# Patient Record
Sex: MALE | Race: OTHER
[De-identification: names, ages, dates, MRNs, and addresses within clinical notes are randomized per-mention and may not be internally consistent; named-entity substitution may affect disease eponyms.]

---

## 2017-01-21 ENCOUNTER — HOSPITAL ENCOUNTER (EMERGENCY)
Dept: HOSPITAL 54 - ER | Age: 42
Discharge: HOME | End: 2017-01-21
Payer: MEDICARE

## 2017-01-21 VITALS — HEIGHT: 74 IN | WEIGHT: 255 LBS | BODY MASS INDEX: 32.73 KG/M2

## 2017-01-21 VITALS — DIASTOLIC BLOOD PRESSURE: 87 MMHG | SYSTOLIC BLOOD PRESSURE: 162 MMHG

## 2017-01-21 DIAGNOSIS — I10: ICD-10-CM

## 2017-01-21 DIAGNOSIS — R13.10: ICD-10-CM

## 2017-01-21 DIAGNOSIS — K12.2: Primary | ICD-10-CM

## 2017-01-21 DIAGNOSIS — M51.26: ICD-10-CM

## 2017-01-21 DIAGNOSIS — G89.29: ICD-10-CM

## 2017-01-21 DIAGNOSIS — E11.9: ICD-10-CM

## 2017-01-21 DIAGNOSIS — M54.5: ICD-10-CM

## 2017-01-21 DIAGNOSIS — F17.200: ICD-10-CM

## 2017-01-21 PROCEDURE — 99283 EMERGENCY DEPT VISIT LOW MDM: CPT

## 2017-01-21 PROCEDURE — Z7610: HCPCS

## 2017-01-21 PROCEDURE — A4606 OXYGEN PROBE USED W OXIMETER: HCPCS

## 2017-06-03 ENCOUNTER — HOSPITAL ENCOUNTER (EMERGENCY)
Dept: HOSPITAL 54 - ER | Age: 42
LOS: 1 days | Discharge: HOME | End: 2017-06-04
Payer: MEDICARE

## 2017-06-03 VITALS — BODY MASS INDEX: 31.57 KG/M2 | HEIGHT: 74 IN | WEIGHT: 246 LBS

## 2017-06-03 DIAGNOSIS — G89.18: ICD-10-CM

## 2017-06-03 DIAGNOSIS — G89.29: ICD-10-CM

## 2017-06-03 DIAGNOSIS — I10: ICD-10-CM

## 2017-06-03 DIAGNOSIS — M79.671: Primary | ICD-10-CM

## 2017-06-03 DIAGNOSIS — E86.0: ICD-10-CM

## 2017-06-03 DIAGNOSIS — E11.65: ICD-10-CM

## 2017-06-03 DIAGNOSIS — Z91.19: ICD-10-CM

## 2017-06-03 DIAGNOSIS — F17.200: ICD-10-CM

## 2017-06-03 DIAGNOSIS — Z98.890: ICD-10-CM

## 2017-06-03 LAB
BASOPHILS # BLD AUTO: 0 /CMM (ref 0–0.2)
BASOPHILS NFR BLD AUTO: 0.4 % (ref 0–2)
BUN SERPL-MCNC: 17 MG/DL (ref 7–18)
CALCIUM SERPL-MCNC: 8.3 MG/DL (ref 8.5–10.1)
CHLORIDE SERPL-SCNC: 98 MMOL/L (ref 98–107)
CO2 SERPL-SCNC: 26 MMOL/L (ref 21–32)
CREAT SERPL-MCNC: 0.9 MG/DL (ref 0.6–1.3)
EOSINOPHIL # BLD AUTO: 0.2 /CMM (ref 0–0.7)
EOSINOPHIL NFR BLD AUTO: 2.8 % (ref 0–6)
GLUCOSE SERPL-MCNC: 491 MG/DL (ref 74–106)
HCT VFR BLD AUTO: 41 % (ref 39–51)
HGB BLD-MCNC: 14 G/DL (ref 13.5–17.5)
LYMPHOCYTES NFR BLD AUTO: 3 /CMM (ref 0.8–4.8)
LYMPHOCYTES NFR BLD AUTO: 36.4 % (ref 20–44)
MCH RBC QN AUTO: 29 PG (ref 26–33)
MCHC RBC AUTO-ENTMCNC: 34 G/DL (ref 31–36)
MCV RBC AUTO: 86 FL (ref 80–96)
MONOCYTES NFR BLD AUTO: 0.5 /CMM (ref 0.1–1.3)
MONOCYTES NFR BLD AUTO: 6.7 % (ref 2–12)
NEUTROPHILS # BLD AUTO: 4.4 /CMM (ref 1.8–8.9)
NEUTROPHILS NFR BLD AUTO: 53.7 % (ref 43–81)
PLATELET # BLD AUTO: 308 /CMM (ref 150–450)
POTASSIUM SERPL-SCNC: 3.6 MMOL/L (ref 3.5–5.1)
RBC # BLD AUTO: 4.78 MIL/UL (ref 4.5–6)
RDW COEFFICIENT OF VARIATION: 13.9 (ref 11.5–15)
SODIUM SERPL-SCNC: 133 MMOL/L (ref 136–145)
WBC NRBC COR # BLD AUTO: 8.1 K/UL (ref 4.3–11)

## 2017-06-03 PROCEDURE — 73630 X-RAY EXAM OF FOOT: CPT

## 2017-06-03 PROCEDURE — 80048 BASIC METABOLIC PNL TOTAL CA: CPT

## 2017-06-03 PROCEDURE — 82962 GLUCOSE BLOOD TEST: CPT

## 2017-06-03 PROCEDURE — 36415 COLL VENOUS BLD VENIPUNCTURE: CPT

## 2017-06-03 PROCEDURE — 96361 HYDRATE IV INFUSION ADD-ON: CPT

## 2017-06-03 PROCEDURE — 85025 COMPLETE CBC W/AUTO DIFF WBC: CPT

## 2017-06-03 PROCEDURE — Z7610: HCPCS

## 2017-06-03 PROCEDURE — 99285 EMERGENCY DEPT VISIT HI MDM: CPT

## 2017-06-03 PROCEDURE — A4606 OXYGEN PROBE USED W OXIMETER: HCPCS

## 2017-06-03 PROCEDURE — 96360 HYDRATION IV INFUSION INIT: CPT

## 2017-06-03 NOTE — NUR
IV removed. Catheter intact and site benign. Pressure and 4x4 applied to site. 
No bleeding noted. Patient discharged to home in stable condition. Written and 
verbal after care instructions given. Patient verbalizes understanding of 
instruction.  ambulatory with a steady gait

## 2017-06-03 NOTE — NUR
PT AMBULATORY TO ER BED 8 C/O RIGHT ANKLE PAIN X 1 WK, GRADUAL ONSET. PT AOX4 
RR EVEN AND UNLABORED. NO SOB NOTED. NAD NOTED. NO NVD AT THIS TIME. PT NOT 
DIAPHORETIC. PT WAITING FOR MD FONG. CAP REFILL ON TL FEET WNL AND PEDIAL 
PULSE NOTED. PT STATES HAD RIGHT FOOT SX 2 YRS AGO.

## 2017-06-04 VITALS — DIASTOLIC BLOOD PRESSURE: 97 MMHG | SYSTOLIC BLOOD PRESSURE: 162 MMHG

## 2019-04-15 ENCOUNTER — HOSPITAL ENCOUNTER (EMERGENCY)
Dept: HOSPITAL 54 - ER | Age: 44
Discharge: HOME | End: 2019-04-15
Payer: MEDICARE

## 2019-04-15 VITALS — HEIGHT: 74 IN | WEIGHT: 280.12 LBS | BODY MASS INDEX: 35.95 KG/M2

## 2019-04-15 VITALS — SYSTOLIC BLOOD PRESSURE: 181 MMHG | DIASTOLIC BLOOD PRESSURE: 98 MMHG

## 2019-04-15 DIAGNOSIS — E11.9: ICD-10-CM
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
## 2019-04-15 DIAGNOSIS — Z98.890: ICD-10-CM
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
## 2019-04-15 DIAGNOSIS — G89.29: ICD-10-CM

## 2019-04-15 DIAGNOSIS — F17.200: ICD-10-CM

## 2019-04-15 DIAGNOSIS — F10.10: ICD-10-CM

## 2019-04-15 DIAGNOSIS — S62.324A: Primary | ICD-10-CM

## 2019-04-15 DIAGNOSIS — M54.9: ICD-10-CM
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
## 2019-04-15 DIAGNOSIS — I10: ICD-10-CM
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
## 2019-04-15 NOTE — NUR
PT BIBSELF COMPLAINING OF R HAND PAIN/RING FINGER PAIN AFTER "SCUFFLE ON 
THURS." PT AXO4. RESPIRATIONS EVEN AND UNLABORED. PT AMBULATORY WITH STEADY 
GAIT TO BED 9.

## 2019-04-15 NOTE — NUR
called DONOVAN for report; [phone] - report iniated

Note: The patient refused [employer] reporting per primary ED provider

## 2019-04-18 ENCOUNTER — HOSPITAL ENCOUNTER (OUTPATIENT)
Dept: HOSPITAL 54 - WOU | Age: 44
Discharge: HOME | End: 2019-04-18
Attending: SURGERY
Payer: MEDICARE

## 2019-04-18 DIAGNOSIS — E66.9: ICD-10-CM

## 2019-04-18 DIAGNOSIS — S62.304A: Primary | ICD-10-CM

## 2019-04-18 DIAGNOSIS — E10.65: ICD-10-CM
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
## 2019-04-18 DIAGNOSIS — E10.610: ICD-10-CM
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
## 2019-04-18 DIAGNOSIS — I10: ICD-10-CM

## 2019-04-18 DIAGNOSIS — F17.210: ICD-10-CM

## 2019-04-18 PROCEDURE — 2W3CX1Z IMMOBILIZATION OF RIGHT LOWER ARM USING SPLINT: ICD-10-PCS | Performed by: SURGERY

## 2019-04-18 PROCEDURE — 29125 APPL SHORT ARM SPLINT STATIC: CPT

## 2019-04-18 PROCEDURE — A6402 STERILE GAUZE <= 16 SQ IN: HCPCS

## 2019-06-25 ENCOUNTER — HOSPITAL ENCOUNTER (OUTPATIENT)
Dept: HOSPITAL 91 - LAB | Age: 44
Discharge: HOME | End: 2019-06-25
Payer: MEDICARE

## 2019-06-25 ENCOUNTER — HOSPITAL ENCOUNTER (OUTPATIENT)
Dept: HOSPITAL 10 - LAB | Age: 44
Discharge: HOME | End: 2019-06-25
Attending: INTERNAL MEDICINE
Payer: MEDICARE

## 2019-06-25 DIAGNOSIS — R07.9: Primary | ICD-10-CM

## 2019-06-25 DIAGNOSIS — R06.02: ICD-10-CM

## 2019-06-25 LAB
ANION GAP: 9 (ref 5–13)
BLOOD UREA NITROGEN: 17 MG/DL (ref 7–20)
CALCIUM: 8.9 MG/DL (ref 8.4–10.2)
CARBON DIOXIDE: 28 MMOL/L (ref 21–31)
CHLORIDE: 105 MMOL/L (ref 97–110)
CREATININE: 0.91 MG/DL (ref 0.61–1.24)
GLUCOSE: 287 MG/DL (ref 70–220)
POTASSIUM: 4.4 MMOL/L (ref 3.5–5.1)
SODIUM: 142 MMOL/L (ref 135–144)

## 2019-06-25 PROCEDURE — 80048 BASIC METABOLIC PNL TOTAL CA: CPT

## 2019-07-15 ENCOUNTER — HOSPITAL ENCOUNTER (OUTPATIENT)
Dept: HOSPITAL 91 - C/S | Age: 44
Discharge: HOME | End: 2019-07-15
Payer: MEDICARE

## 2019-07-15 ENCOUNTER — HOSPITAL ENCOUNTER (OUTPATIENT)
Dept: HOSPITAL 10 - C/S | Age: 44
Discharge: HOME | End: 2019-07-15
Attending: INTERNAL MEDICINE
Payer: MEDICARE

## 2019-07-15 DIAGNOSIS — R07.9: ICD-10-CM

## 2019-07-15 DIAGNOSIS — R93.1: Primary | ICD-10-CM

## 2019-07-15 PROCEDURE — 75574 CT ANGIO HRT W/3D IMAGE: CPT

## 2019-07-15 PROCEDURE — 75571 CT HRT W/O DYE W/CA TEST: CPT

## 2019-07-15 RX ADMIN — METOPROLOL TARTRATE 1 MG: 100 TABLET ORAL at 12:39

## 2019-07-15 RX ADMIN — DILTIAZEM HYDROCHLORIDE 1 MG: 5 INJECTION INTRAVENOUS at 15:21

## 2019-07-15 RX ADMIN — LABETALOL HYDROCHLORIDE 1 MG: 5 INJECTION, SOLUTION INTRAVENOUS at 14:26

## 2019-08-14 ENCOUNTER — HOSPITAL ENCOUNTER (EMERGENCY)
Dept: HOSPITAL 54 - ER | Age: 44
Discharge: HOME | End: 2019-08-14
Payer: MEDICARE

## 2019-08-14 VITALS — WEIGHT: 285 LBS | BODY MASS INDEX: 36.57 KG/M2 | HEIGHT: 74 IN

## 2019-08-14 VITALS — DIASTOLIC BLOOD PRESSURE: 94 MMHG | SYSTOLIC BLOOD PRESSURE: 162 MMHG

## 2019-08-14 DIAGNOSIS — G89.29: ICD-10-CM

## 2019-08-14 DIAGNOSIS — M54.16: Primary | ICD-10-CM

## 2019-08-14 DIAGNOSIS — Z98.890: ICD-10-CM
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
## 2019-08-14 DIAGNOSIS — F10.10: ICD-10-CM

## 2019-08-14 DIAGNOSIS — E11.9: ICD-10-CM

## 2019-08-14 DIAGNOSIS — I10: ICD-10-CM

## 2019-08-14 DIAGNOSIS — F17.200: ICD-10-CM

## 2020-04-24 ENCOUNTER — HOSPITAL ENCOUNTER (INPATIENT)
Dept: HOSPITAL 54 - ER | Age: 45
LOS: 6 days | Discharge: HOME HEALTH | DRG: 622 | End: 2020-04-30
Attending: INTERNAL MEDICINE | Admitting: NURSE PRACTITIONER
Payer: MEDICARE

## 2020-04-24 VITALS — SYSTOLIC BLOOD PRESSURE: 154 MMHG | DIASTOLIC BLOOD PRESSURE: 81 MMHG

## 2020-04-24 VITALS — HEIGHT: 73 IN | WEIGHT: 287 LBS | BODY MASS INDEX: 38.04 KG/M2

## 2020-04-24 DIAGNOSIS — E11.628: ICD-10-CM

## 2020-04-24 DIAGNOSIS — Z98.1: ICD-10-CM

## 2020-04-24 DIAGNOSIS — E11.65: ICD-10-CM

## 2020-04-24 DIAGNOSIS — F12.90: ICD-10-CM

## 2020-04-24 DIAGNOSIS — E44.1: ICD-10-CM

## 2020-04-24 DIAGNOSIS — N17.0: ICD-10-CM

## 2020-04-24 DIAGNOSIS — M21.40: ICD-10-CM

## 2020-04-24 DIAGNOSIS — E78.1: ICD-10-CM

## 2020-04-24 DIAGNOSIS — E11.610: ICD-10-CM

## 2020-04-24 DIAGNOSIS — Z79.84: ICD-10-CM

## 2020-04-24 DIAGNOSIS — E78.5: ICD-10-CM

## 2020-04-24 DIAGNOSIS — E11.42: ICD-10-CM

## 2020-04-24 DIAGNOSIS — Z83.3: ICD-10-CM

## 2020-04-24 DIAGNOSIS — F17.210: ICD-10-CM

## 2020-04-24 DIAGNOSIS — E66.01: ICD-10-CM

## 2020-04-24 DIAGNOSIS — M19.071: ICD-10-CM

## 2020-04-24 DIAGNOSIS — L97.519: ICD-10-CM

## 2020-04-24 DIAGNOSIS — E11.621: Primary | ICD-10-CM

## 2020-04-24 DIAGNOSIS — Z79.899: ICD-10-CM

## 2020-04-24 DIAGNOSIS — D64.9: ICD-10-CM

## 2020-04-24 DIAGNOSIS — Z79.4: ICD-10-CM

## 2020-04-24 DIAGNOSIS — L03.115: ICD-10-CM

## 2020-04-24 DIAGNOSIS — D68.59: ICD-10-CM

## 2020-04-24 DIAGNOSIS — G89.29: ICD-10-CM

## 2020-04-24 DIAGNOSIS — R65.11: ICD-10-CM

## 2020-04-24 LAB
ALBUMIN SERPL BCP-MCNC: 2.6 G/DL (ref 3.4–5)
ALP SERPL-CCNC: 147 U/L (ref 46–116)
ALT SERPL W P-5'-P-CCNC: 43 U/L (ref 12–78)
AST SERPL W P-5'-P-CCNC: 27 U/L (ref 15–37)
BASOPHILS # BLD AUTO: 0.1 /CMM (ref 0–0.2)
BASOPHILS NFR BLD AUTO: 0.4 % (ref 0–2)
BILIRUB DIRECT SERPL-MCNC: 0.1 MG/DL (ref 0–0.2)
BILIRUB SERPL-MCNC: 0.3 MG/DL (ref 0.2–1)
BUN SERPL-MCNC: 25 MG/DL (ref 7–18)
CALCIUM SERPL-MCNC: 8.6 MG/DL (ref 8.5–10.1)
CHLORIDE SERPL-SCNC: 102 MMOL/L (ref 98–107)
CO2 SERPL-SCNC: 29 MMOL/L (ref 21–32)
CREAT SERPL-MCNC: 1.5 MG/DL (ref 0.6–1.3)
EOSINOPHIL NFR BLD AUTO: 2.1 % (ref 0–6)
GLUCOSE SERPL-MCNC: 293 MG/DL (ref 74–106)
HCT VFR BLD AUTO: 42 % (ref 39–51)
HGB BLD-MCNC: 13.8 G/DL (ref 13.5–17.5)
LYMPHOCYTES NFR BLD AUTO: 1.6 /CMM (ref 0.8–4.8)
LYMPHOCYTES NFR BLD AUTO: 11.4 % (ref 20–44)
MCHC RBC AUTO-ENTMCNC: 33 G/DL (ref 31–36)
MCV RBC AUTO: 87 FL (ref 80–96)
MONOCYTES NFR BLD AUTO: 0.8 /CMM (ref 0.1–1.3)
MONOCYTES NFR BLD AUTO: 5.8 % (ref 2–12)
NEUTROPHILS # BLD AUTO: 11.6 /CMM (ref 1.8–8.9)
NEUTROPHILS NFR BLD AUTO: 80.3 % (ref 43–81)
PLATELET # BLD AUTO: 326 /CMM (ref 150–450)
POTASSIUM SERPL-SCNC: 4.3 MMOL/L (ref 3.5–5.1)
PROT SERPL-MCNC: 6.3 G/DL (ref 6.4–8.2)
RBC # BLD AUTO: 4.77 MIL/UL (ref 4.5–6)
SODIUM SERPL-SCNC: 137 MMOL/L (ref 136–145)
WBC NRBC COR # BLD AUTO: 14.4 K/UL (ref 4.3–11)

## 2020-04-24 PROCEDURE — A6403 STERILE GAUZE>16 <= 48 SQ IN: HCPCS

## 2020-04-24 PROCEDURE — G0378 HOSPITAL OBSERVATION PER HR: HCPCS

## 2020-04-24 PROCEDURE — A4362 SOLID SKIN BARRIER: HCPCS

## 2020-04-24 RX ADMIN — HEPARIN SODIUM SCH UNITS: 5000 INJECTION INTRAVENOUS; SUBCUTANEOUS at 23:28

## 2020-04-24 RX ADMIN — HUMAN INSULIN PRN UNIT: 100 INJECTION, SOLUTION SUBCUTANEOUS at 23:49

## 2020-04-24 RX ADMIN — Medication SCH EACH: at 22:00

## 2020-04-24 RX ADMIN — SODIUM CHLORIDE PRN MLS/HR: 9 INJECTION, SOLUTION INTRAVENOUS at 23:25

## 2020-04-24 NOTE — NUR
ASSUMED CARE OF PT; wound to pad of r foot x 3 days s/p walking with rock in 
sandal, PT AAOX4, -SOB, NAD NOTED, PENDING MD FONG

## 2020-04-24 NOTE — NUR
MS RN ADMISSION NOTE 



RECEIVED PATIENT VIA WHEEL CHAIR. PATIENT AMBULATED TO BED WITH STEADY GAIT. TOLERATING ROOM 
AIR. RESPIRATIONS ARE EVEN AND UNLABORED. NO S/S SOB NOTED. STATES PAIN IS 9/10 AT THIS 
TIME, INFORMED I WILL SEE WHAT DOCTOR ORDERED FOR PAIN. IN NO APPARENT DISTRESS. IV ACCESS 
IN LAC#20 PATENT AND SALINE LOCKED. INATAL AND PHYSICAL ASSESSMENT COMPLETED, SKIN 
ASSESSMENT COMPLETED AND PICTURES TAKEN AND PLACED IN CHART. HOME MEDICATIONS TAKEN AND 
SIGNED OVER. VITAL SIGNS OBTAINED BY CNA AS WELL AS BELONGINGS LIST COMPLETED. BED IS LOW 
AND LOCKED, HOB ELEVATED IN SEMI FOWLERS, SIDE RIALS UP X2, CALL LIGHT WITHIN REACH. WILL 
CONTINUE TO MONITOR.

## 2020-04-25 VITALS — SYSTOLIC BLOOD PRESSURE: 160 MMHG | DIASTOLIC BLOOD PRESSURE: 90 MMHG

## 2020-04-25 VITALS — DIASTOLIC BLOOD PRESSURE: 101 MMHG | SYSTOLIC BLOOD PRESSURE: 161 MMHG

## 2020-04-25 VITALS — SYSTOLIC BLOOD PRESSURE: 135 MMHG | DIASTOLIC BLOOD PRESSURE: 85 MMHG

## 2020-04-25 LAB
APPEARANCE UR: CLEAR
BASOPHILS # BLD AUTO: 0 /CMM (ref 0–0.2)
BASOPHILS NFR BLD AUTO: 0.5 % (ref 0–2)
BILIRUB UR QL STRIP: NEGATIVE
BUN SERPL-MCNC: 24 MG/DL (ref 7–18)
CALCIUM SERPL-MCNC: 7.5 MG/DL (ref 8.5–10.1)
CHLORIDE SERPL-SCNC: 103 MMOL/L (ref 98–107)
CHOLEST SERPL-MCNC: 126 MG/DL (ref ?–200)
CO2 SERPL-SCNC: 24 MMOL/L (ref 21–32)
COLOR UR: YELLOW
CREAT SERPL-MCNC: 1.3 MG/DL (ref 0.6–1.3)
EOSINOPHIL NFR BLD AUTO: 6.1 % (ref 0–6)
GLUCOSE SERPL-MCNC: 303 MG/DL (ref 74–106)
GLUCOSE UR STRIP-MCNC: >=1000 MG/DL
HCT VFR BLD AUTO: 35 % (ref 39–51)
HDLC SERPL-MCNC: 43 MG/DL (ref 40–60)
HGB BLD-MCNC: 12.1 G/DL (ref 13.5–17.5)
HGB UR QL STRIP: (no result) ERY/UL
KETONES UR STRIP-MCNC: NEGATIVE MG/DL
LDLC SERPL DIRECT ASSAY-MCNC: 62 MG/DL (ref 0–99)
LEUKOCYTE ESTERASE UR QL STRIP: NEGATIVE
LYMPHOCYTES NFR BLD AUTO: 2.1 /CMM (ref 0.8–4.8)
LYMPHOCYTES NFR BLD AUTO: 23.1 % (ref 20–44)
MAGNESIUM SERPL-MCNC: 2 MG/DL (ref 1.8–2.4)
MCHC RBC AUTO-ENTMCNC: 34 G/DL (ref 31–36)
MCV RBC AUTO: 88 FL (ref 80–96)
MONOCYTES NFR BLD AUTO: 0.6 /CMM (ref 0.1–1.3)
MONOCYTES NFR BLD AUTO: 6.9 % (ref 2–12)
NEUTROPHILS # BLD AUTO: 5.7 /CMM (ref 1.8–8.9)
NEUTROPHILS NFR BLD AUTO: 63.4 % (ref 43–81)
NITRITE UR QL STRIP: NEGATIVE
PH UR STRIP: 6 [PH] (ref 5–8)
PHOSPHATE SERPL-MCNC: 4.4 MG/DL (ref 2.5–4.9)
PLATELET # BLD AUTO: 258 /CMM (ref 150–450)
POTASSIUM SERPL-SCNC: 3.8 MMOL/L (ref 3.5–5.1)
PROT UR QL STRIP: >=300 MG/DL
RBC # BLD AUTO: 4.03 MIL/UL (ref 4.5–6)
RBC #/AREA URNS HPF: (no result) /HPF (ref 0–2)
SODIUM SERPL-SCNC: 137 MMOL/L (ref 136–145)
TRIGL SERPL-MCNC: 240 MG/DL (ref 30–150)
UROBILINOGEN UR STRIP-MCNC: 0.2 EU/DL
WBC #/AREA URNS HPF: (no result) /HPF (ref 0–3)
WBC NRBC COR # BLD AUTO: 8.9 K/UL (ref 4.3–11)

## 2020-04-25 PROCEDURE — 0JBQ0ZZ EXCISION OF RIGHT FOOT SUBCUTANEOUS TISSUE AND FASCIA, OPEN APPROACH: ICD-10-PCS | Performed by: STUDENT IN AN ORGANIZED HEALTH CARE EDUCATION/TRAINING PROGRAM

## 2020-04-25 RX ADMIN — PIPERACILLIN SODIUM AND TAZOBACTAM SODIUM SCH MLS/HR: .375; 3 INJECTION, POWDER, LYOPHILIZED, FOR SOLUTION INTRAVENOUS at 00:21

## 2020-04-25 RX ADMIN — INSULIN HUMAN PRN UNIT: 100 INJECTION, SOLUTION PARENTERAL at 06:17

## 2020-04-25 RX ADMIN — Medication PRN MG: at 22:11

## 2020-04-25 RX ADMIN — PIPERACILLIN SODIUM AND TAZOBACTAM SODIUM SCH MLS/HR: .375; 3 INJECTION, POWDER, LYOPHILIZED, FOR SOLUTION INTRAVENOUS at 14:01

## 2020-04-25 RX ADMIN — SODIUM CHLORIDE PRN MLS/HR: 9 INJECTION, SOLUTION INTRAVENOUS at 20:56

## 2020-04-25 RX ADMIN — HEPARIN SODIUM SCH UNITS: 5000 INJECTION INTRAVENOUS; SUBCUTANEOUS at 10:02

## 2020-04-25 RX ADMIN — DEXTROSE MONOHYDRATE SCH MLS/HR: 50 INJECTION, SOLUTION INTRAVENOUS at 21:00

## 2020-04-25 RX ADMIN — PREGABALIN SCH MG: 100 CAPSULE ORAL at 14:01

## 2020-04-25 RX ADMIN — NICOTINE SCH MG: 14 PATCH TRANSDERMAL at 03:01

## 2020-04-25 RX ADMIN — ATORVASTATIN CALCIUM SCH MG: 40 TABLET, FILM COATED ORAL at 00:46

## 2020-04-25 RX ADMIN — OXYCODONE HYDROCHLORIDE AND ACETAMINOPHEN PRN UDTAB: 5; 325 TABLET ORAL at 11:38

## 2020-04-25 RX ADMIN — HEPARIN SODIUM SCH UNITS: 5000 INJECTION INTRAVENOUS; SUBCUTANEOUS at 22:06

## 2020-04-25 RX ADMIN — METOPROLOL SUCCINATE SCH MG: 25 TABLET, EXTENDED RELEASE ORAL at 10:00

## 2020-04-25 RX ADMIN — Medication PRN MG: at 18:05

## 2020-04-25 RX ADMIN — ATORVASTATIN CALCIUM SCH MG: 40 TABLET, FILM COATED ORAL at 21:58

## 2020-04-25 RX ADMIN — Medication SCH EACH: at 06:15

## 2020-04-25 RX ADMIN — PREGABALIN SCH MG: 100 CAPSULE ORAL at 09:59

## 2020-04-25 RX ADMIN — PREGABALIN SCH MG: 100 CAPSULE ORAL at 18:13

## 2020-04-25 RX ADMIN — Medication PRN MG: at 09:40

## 2020-04-25 RX ADMIN — Medication SCH EACH: at 22:21

## 2020-04-25 RX ADMIN — Medication SCH EACH: at 13:23

## 2020-04-25 RX ADMIN — PIPERACILLIN SODIUM AND TAZOBACTAM SODIUM SCH MLS/HR: .375; 3 INJECTION, POWDER, LYOPHILIZED, FOR SOLUTION INTRAVENOUS at 18:20

## 2020-04-25 RX ADMIN — HUMAN INSULIN PRN UNIT: 100 INJECTION, SOLUTION SUBCUTANEOUS at 22:23

## 2020-04-25 RX ADMIN — ASPIRIN 81 MG SCH MG: 81 TABLET ORAL at 09:59

## 2020-04-25 RX ADMIN — Medication PRN MG: at 13:35

## 2020-04-25 RX ADMIN — NICOTINE SCH MG: 14 PATCH TRANSDERMAL at 10:00

## 2020-04-25 RX ADMIN — PREGABALIN SCH MG: 100 CAPSULE ORAL at 00:46

## 2020-04-25 RX ADMIN — PIPERACILLIN SODIUM AND TAZOBACTAM SODIUM SCH MLS/HR: .375; 3 INJECTION, POWDER, LYOPHILIZED, FOR SOLUTION INTRAVENOUS at 06:07

## 2020-04-25 RX ADMIN — INSULIN HUMAN PRN UNIT: 100 INJECTION, SOLUTION PARENTERAL at 13:29

## 2020-04-25 RX ADMIN — Medication SCH EACH: at 18:14

## 2020-04-25 RX ADMIN — INSULIN HUMAN PRN UNIT: 100 INJECTION, SOLUTION PARENTERAL at 18:23

## 2020-04-25 RX ADMIN — DEXTROSE MONOHYDRATE SCH MLS/HR: 50 INJECTION, SOLUTION INTRAVENOUS at 11:21

## 2020-04-25 RX ADMIN — OXYCODONE HYDROCHLORIDE AND ACETAMINOPHEN PRN UDTAB: 5; 325 TABLET ORAL at 00:47

## 2020-04-25 NOTE — NUR
MS RN: RECEIVED PATIENT 

Patient in bed, awake. On room air, tolerating room air. Right foot dressing C/D/I. Pain 
scale and pain medication reviewed with patient, verbalized understanding. IVF infusing. 
Fall precaution maintained.

## 2020-04-25 NOTE — NUR
MS RN NOTE 



CALLED ON CALL MD DAVIN CHAVEZ TO INFORM ABOUT PATIENTS REQUEST FOR A NICOTINE PATCH. HE 
SMOKES ONE PACK PER DAY. MD TELEPHONE ORDER 14MG NICOTINE PATCH. ORDER READ BACK NOTED AND 
CARRIED OUT.

## 2020-04-25 NOTE — NUR
TIFFANY NP,AVNI ORDAZ.DR. RAUSCH IN.CULTURE DONE OF RT. FOOT.DEBRIDEMENT DONE AND CAT 
SCAN ORDERED.UA AND URINE CULTURE SENT.MED. X 3 FOR PAIN.

## 2020-04-25 NOTE — NUR
MS RN NOTE 



ADMINISTERED FLEXORIL PER PATIENT REQUEST. ADMINISTERED PRN PERCOCET 5/325 X2 TAB D/T C/O 
PAIN 10/10 IN RIGHT FOOT. WILL CONTINUE TO MONITOR.

## 2020-04-26 VITALS — SYSTOLIC BLOOD PRESSURE: 170 MMHG | DIASTOLIC BLOOD PRESSURE: 96 MMHG

## 2020-04-26 VITALS — DIASTOLIC BLOOD PRESSURE: 86 MMHG | SYSTOLIC BLOOD PRESSURE: 176 MMHG

## 2020-04-26 VITALS — SYSTOLIC BLOOD PRESSURE: 150 MMHG | DIASTOLIC BLOOD PRESSURE: 94 MMHG

## 2020-04-26 LAB
BASOPHILS # BLD AUTO: 0 /CMM (ref 0–0.2)
BASOPHILS NFR BLD AUTO: 0.5 % (ref 0–2)
BUN SERPL-MCNC: 13 MG/DL (ref 7–18)
CALCIUM SERPL-MCNC: 7.4 MG/DL (ref 8.5–10.1)
CHLORIDE SERPL-SCNC: 103 MMOL/L (ref 98–107)
CO2 SERPL-SCNC: 25 MMOL/L (ref 21–32)
CREAT SERPL-MCNC: 0.9 MG/DL (ref 0.6–1.3)
EOSINOPHIL NFR BLD AUTO: 5.9 % (ref 0–6)
GLUCOSE SERPL-MCNC: 223 MG/DL (ref 74–106)
HCT VFR BLD AUTO: 34 % (ref 39–51)
HGB BLD-MCNC: 11.8 G/DL (ref 13.5–17.5)
LYMPHOCYTES NFR BLD AUTO: 1.6 /CMM (ref 0.8–4.8)
LYMPHOCYTES NFR BLD AUTO: 23.8 % (ref 20–44)
MCHC RBC AUTO-ENTMCNC: 34 G/DL (ref 31–36)
MCV RBC AUTO: 86 FL (ref 80–96)
MONOCYTES NFR BLD AUTO: 0.5 /CMM (ref 0.1–1.3)
MONOCYTES NFR BLD AUTO: 7.3 % (ref 2–12)
NEUTROPHILS # BLD AUTO: 4.3 /CMM (ref 1.8–8.9)
NEUTROPHILS NFR BLD AUTO: 62.5 % (ref 43–81)
PLATELET # BLD AUTO: 264 /CMM (ref 150–450)
POTASSIUM SERPL-SCNC: 3.8 MMOL/L (ref 3.5–5.1)
RBC # BLD AUTO: 3.98 MIL/UL (ref 4.5–6)
SODIUM SERPL-SCNC: 136 MMOL/L (ref 136–145)
WBC NRBC COR # BLD AUTO: 6.9 K/UL (ref 4.3–11)

## 2020-04-26 RX ADMIN — METOPROLOL SUCCINATE SCH MG: 25 TABLET, EXTENDED RELEASE ORAL at 08:10

## 2020-04-26 RX ADMIN — PIPERACILLIN SODIUM AND TAZOBACTAM SODIUM SCH MLS/HR: .375; 3 INJECTION, POWDER, LYOPHILIZED, FOR SOLUTION INTRAVENOUS at 23:35

## 2020-04-26 RX ADMIN — Medication PRN MG: at 21:57

## 2020-04-26 RX ADMIN — Medication SCH EACH: at 07:55

## 2020-04-26 RX ADMIN — SODIUM CHLORIDE PRN MLS/HR: 9 INJECTION, SOLUTION INTRAVENOUS at 23:37

## 2020-04-26 RX ADMIN — NICOTINE SCH MG: 14 PATCH TRANSDERMAL at 08:10

## 2020-04-26 RX ADMIN — HEPARIN SODIUM SCH UNITS: 5000 INJECTION INTRAVENOUS; SUBCUTANEOUS at 08:16

## 2020-04-26 RX ADMIN — PIPERACILLIN SODIUM AND TAZOBACTAM SODIUM SCH MLS/HR: .375; 3 INJECTION, POWDER, LYOPHILIZED, FOR SOLUTION INTRAVENOUS at 00:44

## 2020-04-26 RX ADMIN — PREGABALIN SCH MG: 100 CAPSULE ORAL at 08:10

## 2020-04-26 RX ADMIN — Medication PRN MG: at 03:25

## 2020-04-26 RX ADMIN — DEXTROSE MONOHYDRATE SCH MLS/HR: 50 INJECTION, SOLUTION INTRAVENOUS at 08:05

## 2020-04-26 RX ADMIN — PREGABALIN SCH MG: 100 CAPSULE ORAL at 17:04

## 2020-04-26 RX ADMIN — HUMAN INSULIN PRN UNIT: 100 INJECTION, SOLUTION SUBCUTANEOUS at 21:25

## 2020-04-26 RX ADMIN — PREGABALIN SCH MG: 100 CAPSULE ORAL at 13:29

## 2020-04-26 RX ADMIN — HEPARIN SODIUM SCH UNITS: 5000 INJECTION INTRAVENOUS; SUBCUTANEOUS at 21:12

## 2020-04-26 RX ADMIN — INSULIN HUMAN PRN UNIT: 100 INJECTION, SOLUTION PARENTERAL at 17:44

## 2020-04-26 RX ADMIN — Medication PRN MG: at 17:47

## 2020-04-26 RX ADMIN — DEXTROSE MONOHYDRATE SCH MLS/HR: 50 INJECTION, SOLUTION INTRAVENOUS at 21:07

## 2020-04-26 RX ADMIN — INSULIN HUMAN PRN UNIT: 100 INJECTION, SOLUTION PARENTERAL at 06:38

## 2020-04-26 RX ADMIN — PIPERACILLIN SODIUM AND TAZOBACTAM SODIUM SCH MLS/HR: .375; 3 INJECTION, POWDER, LYOPHILIZED, FOR SOLUTION INTRAVENOUS at 05:53

## 2020-04-26 RX ADMIN — Medication SCH EACH: at 13:25

## 2020-04-26 RX ADMIN — Medication PRN MG: at 08:32

## 2020-04-26 RX ADMIN — PIPERACILLIN SODIUM AND TAZOBACTAM SODIUM SCH MLS/HR: .375; 3 INJECTION, POWDER, LYOPHILIZED, FOR SOLUTION INTRAVENOUS at 17:08

## 2020-04-26 RX ADMIN — ASPIRIN 81 MG SCH MG: 81 TABLET ORAL at 08:10

## 2020-04-26 RX ADMIN — Medication SCH EACH: at 21:20

## 2020-04-26 RX ADMIN — Medication SCH EACH: at 17:03

## 2020-04-26 RX ADMIN — INSULIN HUMAN PRN UNIT: 100 INJECTION, SOLUTION PARENTERAL at 08:03

## 2020-04-26 RX ADMIN — ATORVASTATIN CALCIUM SCH MG: 40 TABLET, FILM COATED ORAL at 21:12

## 2020-04-26 RX ADMIN — Medication PRN MG: at 13:32

## 2020-04-26 RX ADMIN — PIPERACILLIN SODIUM AND TAZOBACTAM SODIUM SCH MLS/HR: .375; 3 INJECTION, POWDER, LYOPHILIZED, FOR SOLUTION INTRAVENOUS at 13:25

## 2020-04-26 RX ADMIN — INSULIN HUMAN PRN UNIT: 100 INJECTION, SOLUTION PARENTERAL at 13:28

## 2020-04-26 NOTE — NUR
rn notes

 administered morphine sulfate 4 mg/ml iv push for right leg acute pain 8/10 per patient 
request, v/s taken bp 170/ 88, p-94, r-20.

## 2020-04-26 NOTE — NUR
RN NOTES

 RECEIVED PATIENT IN THE BED A/O X4, NO ACUTE RESPIRATORY DISTRESS, V/S TAKEN STABLE, WAS 
COMPLAINING OF PAIN ON RIGHT LEG, BS-233MG/DL, INFUSING NS AT 60 ML/HR  LEFT AC AREA INTACT. 
ENCOURAGED TO KEEP RIGHT LEG ELEVATED USING PILLOW, PATIENT USING URINAL. ADMINISTERED 
SCHEDULED MEDICATION, AND STARTED VANCOMYCIN INFUSING  ML/HR. NEEDS ATTENDED  AND 
ANTICIPATED. CONTINUED MONITORING.

## 2020-04-26 NOTE — NUR
RN NOTES

 ADMINISTERED MORPHINE SULFATE 4 MG/ML IV PUSH FOR RIGHT LEG PAIN 9/10 PER PAIN SCALE,  AND 
ZOFRAN 4 MG/ML IV PUSH FOR NAUSEA PER PATIENT REQUEST, V/S TAKEN /94, P-84, CALL LIGHT 
WITHIN TO REACH. CONTINUED MONITORING.

## 2020-04-26 NOTE — NUR
RECEIVED IN BED . ALERT AND ORIENTATED. ASKING FOR FOOD . NOTED RT DELTOID HE HAS A NICODERM 
PATCH ON.  HE IS FRIENDLY AND COOPERATITVE

## 2020-04-26 NOTE — NUR
RN NOTES

 ADMINISTERED MORPHINE SULFATE 4 MG/ML IV PUSH FOR PAIN ON RIGHT LEG 9/10 PER PATIENT 
REQUEST, V/S TAKEN //107, P-84, R-20.  BS-246 MG/DL COVERAGE GIVEN, PATIENT USING 
WALKER TO AMBULATE, TOLERATED LUNCH 100%.  ADMINISTERED SCHEDULED MEDICATION.

## 2020-04-26 NOTE — NUR
RN NOTES

 MEDICATION WERE ADMINISTERED FOR PAIN EFFECTIVE,  PATIENT TOLERATED DINER WELL, 
ADMINISTERED SCHEDULED MEDICATION, BS-160 MG/DL COVERAGE GIVEN, INFUSING NS 60 ML/HR AT LEFT 
AC AREA, INTACT. CALL LIGHT WITHIN TO REACH. ENDORSED ONCOMING NURSE FOLLOW PLAN OF CARE.

## 2020-04-26 NOTE — NUR
MS RN: END OF SHIFT REPORT

Patient in bed. Stable Oxygen saturation on room air, tolerating well. IVF infusing, IV 
antibiotic as scheduled, afebrile overnight. Right foot dressing C/D/I. NWB RLE per 
Podiatry. PT to provide boot to right foot. Right foot pain managed with PRN Morphine. Fall 
precaution maintained.

## 2020-04-27 VITALS — DIASTOLIC BLOOD PRESSURE: 88 MMHG | SYSTOLIC BLOOD PRESSURE: 164 MMHG

## 2020-04-27 VITALS — DIASTOLIC BLOOD PRESSURE: 88 MMHG | SYSTOLIC BLOOD PRESSURE: 159 MMHG

## 2020-04-27 VITALS — DIASTOLIC BLOOD PRESSURE: 87 MMHG | SYSTOLIC BLOOD PRESSURE: 157 MMHG

## 2020-04-27 VITALS — DIASTOLIC BLOOD PRESSURE: 99 MMHG | SYSTOLIC BLOOD PRESSURE: 168 MMHG

## 2020-04-27 VITALS — DIASTOLIC BLOOD PRESSURE: 72 MMHG | SYSTOLIC BLOOD PRESSURE: 185 MMHG

## 2020-04-27 VITALS — DIASTOLIC BLOOD PRESSURE: 76 MMHG | SYSTOLIC BLOOD PRESSURE: 136 MMHG

## 2020-04-27 LAB
BASOPHILS # BLD AUTO: 0 /CMM (ref 0–0.2)
BASOPHILS NFR BLD AUTO: 0.5 % (ref 0–2)
BUN SERPL-MCNC: 10 MG/DL (ref 7–18)
CALCIUM SERPL-MCNC: 7.8 MG/DL (ref 8.5–10.1)
CHLORIDE SERPL-SCNC: 104 MMOL/L (ref 98–107)
CO2 SERPL-SCNC: 27 MMOL/L (ref 21–32)
CREAT SERPL-MCNC: 1 MG/DL (ref 0.6–1.3)
EOSINOPHIL NFR BLD AUTO: 6.2 % (ref 0–6)
GLUCOSE SERPL-MCNC: 214 MG/DL (ref 74–106)
HCT VFR BLD AUTO: 34 % (ref 39–51)
HGB BLD-MCNC: 11.8 G/DL (ref 13.5–17.5)
LYMPHOCYTES NFR BLD AUTO: 2 /CMM (ref 0.8–4.8)
LYMPHOCYTES NFR BLD AUTO: 31.5 % (ref 20–44)
MCHC RBC AUTO-ENTMCNC: 35 G/DL (ref 31–36)
MCV RBC AUTO: 86 FL (ref 80–96)
MONOCYTES NFR BLD AUTO: 0.6 /CMM (ref 0.1–1.3)
MONOCYTES NFR BLD AUTO: 9.2 % (ref 2–12)
NEUTROPHILS # BLD AUTO: 3.4 /CMM (ref 1.8–8.9)
NEUTROPHILS NFR BLD AUTO: 52.6 % (ref 43–81)
PLATELET # BLD AUTO: 277 /CMM (ref 150–450)
POTASSIUM SERPL-SCNC: 3.7 MMOL/L (ref 3.5–5.1)
RBC # BLD AUTO: 3.94 MIL/UL (ref 4.5–6)
SODIUM SERPL-SCNC: 139 MMOL/L (ref 136–145)
WBC NRBC COR # BLD AUTO: 6.5 K/UL (ref 4.3–11)

## 2020-04-27 RX ADMIN — DEXTROSE MONOHYDRATE SCH MLS/HR: 50 INJECTION, SOLUTION INTRAVENOUS at 20:37

## 2020-04-27 RX ADMIN — PIPERACILLIN SODIUM AND TAZOBACTAM SODIUM SCH MLS/HR: .375; 3 INJECTION, POWDER, LYOPHILIZED, FOR SOLUTION INTRAVENOUS at 05:50

## 2020-04-27 RX ADMIN — ASPIRIN 81 MG SCH MG: 81 TABLET ORAL at 08:30

## 2020-04-27 RX ADMIN — PIPERACILLIN SODIUM AND TAZOBACTAM SODIUM SCH MLS/HR: .375; 3 INJECTION, POWDER, LYOPHILIZED, FOR SOLUTION INTRAVENOUS at 17:02

## 2020-04-27 RX ADMIN — INSULIN HUMAN PRN UNIT: 100 INJECTION, SOLUTION PARENTERAL at 06:37

## 2020-04-27 RX ADMIN — METOPROLOL SUCCINATE SCH MG: 25 TABLET, EXTENDED RELEASE ORAL at 08:30

## 2020-04-27 RX ADMIN — CLONIDINE HYDROCHLORIDE PRN MG: 0.1 TABLET ORAL at 14:05

## 2020-04-27 RX ADMIN — ATORVASTATIN CALCIUM SCH MG: 40 TABLET, FILM COATED ORAL at 21:27

## 2020-04-27 RX ADMIN — MUPIROCIN SCH GM: 20 OINTMENT TOPICAL at 21:48

## 2020-04-27 RX ADMIN — HEPARIN SODIUM SCH UNITS: 5000 INJECTION INTRAVENOUS; SUBCUTANEOUS at 08:40

## 2020-04-27 RX ADMIN — PIPERACILLIN SODIUM AND TAZOBACTAM SODIUM SCH MLS/HR: .375; 3 INJECTION, POWDER, LYOPHILIZED, FOR SOLUTION INTRAVENOUS at 13:05

## 2020-04-27 RX ADMIN — Medication SCH EACH: at 06:33

## 2020-04-27 RX ADMIN — Medication PRN MG: at 04:06

## 2020-04-27 RX ADMIN — HUMAN INSULIN PRN UNIT: 100 INJECTION, SOLUTION SUBCUTANEOUS at 21:58

## 2020-04-27 RX ADMIN — NICOTINE SCH MG: 14 PATCH TRANSDERMAL at 08:29

## 2020-04-27 RX ADMIN — HYDROMORPHONE HYDROCHLORIDE PRN MG: 1 INJECTION, SOLUTION INTRAMUSCULAR; INTRAVENOUS; SUBCUTANEOUS at 20:38

## 2020-04-27 RX ADMIN — PREGABALIN SCH MG: 100 CAPSULE ORAL at 17:00

## 2020-04-27 RX ADMIN — INSULIN HUMAN PRN UNIT: 100 INJECTION, SOLUTION PARENTERAL at 17:03

## 2020-04-27 RX ADMIN — HEPARIN SODIUM SCH UNITS: 5000 INJECTION INTRAVENOUS; SUBCUTANEOUS at 20:39

## 2020-04-27 RX ADMIN — PREGABALIN SCH MG: 100 CAPSULE ORAL at 13:04

## 2020-04-27 RX ADMIN — Medication SCH EACH: at 21:53

## 2020-04-27 RX ADMIN — INSULIN HUMAN PRN UNIT: 100 INJECTION, SOLUTION PARENTERAL at 13:02

## 2020-04-27 RX ADMIN — PREGABALIN SCH MG: 100 CAPSULE ORAL at 08:32

## 2020-04-27 RX ADMIN — HYDROMORPHONE HYDROCHLORIDE PRN MG: 1 INJECTION, SOLUTION INTRAMUSCULAR; INTRAVENOUS; SUBCUTANEOUS at 13:35

## 2020-04-27 RX ADMIN — DEXTROSE MONOHYDRATE SCH MLS/HR: 50 INJECTION, SOLUTION INTRAVENOUS at 08:25

## 2020-04-27 RX ADMIN — Medication SCH EACH: at 13:04

## 2020-04-27 RX ADMIN — Medication SCH EACH: at 17:01

## 2020-04-27 RX ADMIN — Medication PRN MG: at 08:31

## 2020-04-27 RX ADMIN — MUPIROCIN SCH GM: 20 OINTMENT TOPICAL at 12:59

## 2020-04-27 RX ADMIN — CLONIDINE HYDROCHLORIDE PRN MG: 0.1 TABLET ORAL at 22:22

## 2020-04-27 NOTE — NUR
rn notes

 medication were administered for Bp effective bp 135/76, p-63, bs-276 mg/dl coverage given, 
patient tolerated dinner well, patient wearing boots on right lef, non-WB RLE. call light 
within to reach. endorsed oncoming nurse follow plan of care.

## 2020-04-27 NOTE — NUR
RN NOTES

 ADMINISTERED MORPHINE SULFATE 4MG/ML IV PUSH FOR PAIN 8/10 PER PAIN SCALE, PER PATIENT 
REQUEST. V/S TAKEN /86, P-98, R-20. ENCOURAGED TO INCREASE FLUID INTAKE.

## 2020-04-27 NOTE — NUR
WOUND CARE CONSULT: PT PRESENTS WITH SURGICAL DRESSING TO RT LOWER EXTREMITY AND IS FOLLOWED 
BY PODIATRY TEAM. DEFER TO DPM FOR WOUND TREATMENT PLAN. WILL SEE PRN. PT IS CONTINENT AND 
INDEPENDENT WITH BED MOBILITY.

## 2020-04-27 NOTE — NUR
rn notes

 administered Dilaudid 1 mg/ml iv push for right leg pain 8/10 per patient request, v/s 
taken  bp184/95, p-74, r-20.

## 2020-04-27 NOTE — NUR
ENDING NOTES:  MEDICATED FOR PAIN  X2 THIS 12 HOURS, AND EFFECTIVE FOR COMFORT .  AT 0000 HE 
C/0 H/A GAVE HIM TYLEONL AND TOOK HIS B/P 190 /101 HR 81..MADE MD THORPE AWARE AND A 1 X 
DOSE HYDALIZINE GIVEN B/P NOW /88 HR81 AND THE H/A GONE.

## 2020-04-27 NOTE — NUR
RN medsurg opening notes

Received Pt from morning nurse. Pt is resting in bed comfortably. Pt is alert and 
orientedX4. Respiration is normal in room air. No SOB. No S/S of distress noted. IV sites at 
LAC# 20 is clean, intact, patent and SL. Keep R leg elevated. Safety precautions is 
maintained. Bed at low position, brakes locked, side rails upX2, bed alarm is on and call 
light is within reach. Will continue to monitor.

## 2020-04-27 NOTE — NUR
RN NOTES

 SEEN  PATIENT IN THE BED A/A/O X4, NO ACUTE RESPIRATORY DISTRESS, V/S TAKEN STABLE BP 
161/85, P-98, WAS COMPLAINING OF PAIN ON LOWER BACK 8/10, INFUSING NS AT 60 ML/HR  LEFT AC 
AREA INTACT. ENCOURAGED TO KEEP RIGHT LEG ELEVATED USING PILLOW,PATIENT HAS NON-WB RLE. 
PATIENT USING URINAL. ADMINISTERED SCHEDULED MEDICATION, AND STARTED VANCOMYCIN INFUSING AT 
250 ML/HR.CALL LIGHT WITHIN TO REACH. SEEN HOSPITALIST AND GET ORDER BP MEDICATION. NEEDS 
ATTENDED  AND ANTICIPATED. CONTINUED MONITORING.

## 2020-04-28 VITALS — DIASTOLIC BLOOD PRESSURE: 90 MMHG | SYSTOLIC BLOOD PRESSURE: 163 MMHG

## 2020-04-28 VITALS — SYSTOLIC BLOOD PRESSURE: 163 MMHG | DIASTOLIC BLOOD PRESSURE: 96 MMHG

## 2020-04-28 VITALS — DIASTOLIC BLOOD PRESSURE: 100 MMHG | SYSTOLIC BLOOD PRESSURE: 175 MMHG

## 2020-04-28 VITALS — SYSTOLIC BLOOD PRESSURE: 156 MMHG | DIASTOLIC BLOOD PRESSURE: 95 MMHG

## 2020-04-28 LAB
BASOPHILS # BLD AUTO: 0 /CMM (ref 0–0.2)
BASOPHILS NFR BLD AUTO: 0.4 % (ref 0–2)
BUN SERPL-MCNC: 9 MG/DL (ref 7–18)
CALCIUM SERPL-MCNC: 8.2 MG/DL (ref 8.5–10.1)
CHLORIDE SERPL-SCNC: 103 MMOL/L (ref 98–107)
CO2 SERPL-SCNC: 29 MMOL/L (ref 21–32)
CREAT SERPL-MCNC: 0.9 MG/DL (ref 0.6–1.3)
EOSINOPHIL NFR BLD AUTO: 6 % (ref 0–6)
GLUCOSE SERPL-MCNC: 250 MG/DL (ref 74–106)
HCT VFR BLD AUTO: 38 % (ref 39–51)
HGB BLD-MCNC: 13.1 G/DL (ref 13.5–17.5)
LYMPHOCYTES NFR BLD AUTO: 1.9 /CMM (ref 0.8–4.8)
LYMPHOCYTES NFR BLD AUTO: 24.2 % (ref 20–44)
MCHC RBC AUTO-ENTMCNC: 34 G/DL (ref 31–36)
MCV RBC AUTO: 86 FL (ref 80–96)
MONOCYTES NFR BLD AUTO: 0.5 /CMM (ref 0.1–1.3)
MONOCYTES NFR BLD AUTO: 6.5 % (ref 2–12)
NEUTROPHILS # BLD AUTO: 5.1 /CMM (ref 1.8–8.9)
NEUTROPHILS NFR BLD AUTO: 62.9 % (ref 43–81)
PLATELET # BLD AUTO: 304 /CMM (ref 150–450)
POTASSIUM SERPL-SCNC: 3.9 MMOL/L (ref 3.5–5.1)
RBC # BLD AUTO: 4.45 MIL/UL (ref 4.5–6)
SODIUM SERPL-SCNC: 136 MMOL/L (ref 136–145)
WBC NRBC COR # BLD AUTO: 8 K/UL (ref 4.3–11)

## 2020-04-28 RX ADMIN — HEPARIN SODIUM SCH UNITS: 5000 INJECTION INTRAVENOUS; SUBCUTANEOUS at 09:00

## 2020-04-28 RX ADMIN — IBUPROFEN PRN MG: 400 TABLET, FILM COATED ORAL at 13:52

## 2020-04-28 RX ADMIN — INSULIN HUMAN PRN UNIT: 100 INJECTION, SOLUTION PARENTERAL at 06:49

## 2020-04-28 RX ADMIN — Medication SCH EACH: at 17:30

## 2020-04-28 RX ADMIN — HYDROMORPHONE HYDROCHLORIDE PRN MG: 1 INJECTION, SOLUTION INTRAMUSCULAR; INTRAVENOUS; SUBCUTANEOUS at 13:45

## 2020-04-28 RX ADMIN — HYDROMORPHONE HYDROCHLORIDE PRN MG: 1 INJECTION, SOLUTION INTRAMUSCULAR; INTRAVENOUS; SUBCUTANEOUS at 12:47

## 2020-04-28 RX ADMIN — CLONIDINE HYDROCHLORIDE PRN MG: 0.1 TABLET ORAL at 15:38

## 2020-04-28 RX ADMIN — MUPIROCIN SCH GM: 20 OINTMENT TOPICAL at 22:07

## 2020-04-28 RX ADMIN — HYDROMORPHONE HYDROCHLORIDE PRN MG: 1 INJECTION, SOLUTION INTRAMUSCULAR; INTRAVENOUS; SUBCUTANEOUS at 15:36

## 2020-04-28 RX ADMIN — HYDROMORPHONE HYDROCHLORIDE PRN MG: 1 INJECTION, SOLUTION INTRAMUSCULAR; INTRAVENOUS; SUBCUTANEOUS at 04:05

## 2020-04-28 RX ADMIN — METOPROLOL SUCCINATE SCH MG: 25 TABLET, EXTENDED RELEASE ORAL at 09:00

## 2020-04-28 RX ADMIN — PREGABALIN SCH MG: 100 CAPSULE ORAL at 18:30

## 2020-04-28 RX ADMIN — HUMAN INSULIN PRN UNIT: 100 INJECTION, SOLUTION SUBCUTANEOUS at 22:05

## 2020-04-28 RX ADMIN — DEXTROSE MONOHYDRATE SCH MLS/HR: 50 INJECTION, SOLUTION INTRAVENOUS at 09:00

## 2020-04-28 RX ADMIN — Medication SCH EACH: at 06:47

## 2020-04-28 RX ADMIN — Medication SCH EACH: at 22:03

## 2020-04-28 RX ADMIN — NICOTINE SCH MG: 14 PATCH TRANSDERMAL at 09:00

## 2020-04-28 RX ADMIN — PREGABALIN SCH MG: 100 CAPSULE ORAL at 09:00

## 2020-04-28 RX ADMIN — Medication SCH EACH: at 12:00

## 2020-04-28 RX ADMIN — PREGABALIN SCH MG: 100 CAPSULE ORAL at 14:50

## 2020-04-28 RX ADMIN — ATORVASTATIN CALCIUM SCH MG: 40 TABLET, FILM COATED ORAL at 21:42

## 2020-04-28 RX ADMIN — DEXTROSE MONOHYDRATE SCH MLS/HR: 50 INJECTION, SOLUTION INTRAVENOUS at 21:51

## 2020-04-28 RX ADMIN — MUPIROCIN SCH GM: 20 OINTMENT TOPICAL at 10:00

## 2020-04-28 RX ADMIN — ASPIRIN 81 MG SCH MG: 81 TABLET ORAL at 09:00

## 2020-04-28 RX ADMIN — HYDROMORPHONE HYDROCHLORIDE PRN MG: 1 INJECTION, SOLUTION INTRAMUSCULAR; INTRAVENOUS; SUBCUTANEOUS at 21:40

## 2020-04-28 RX ADMIN — HEPARIN SODIUM SCH UNITS: 5000 INJECTION INTRAVENOUS; SUBCUTANEOUS at 21:54

## 2020-04-28 NOTE — NUR
RN open notes



Patient is laying in bed. Bed is in lowest locked position with side rails up x2. No SOB/ 
acute respiratory distress noted. No complaints of pain at the moment. Call light is within 
reach. Will continue to monitor.

## 2020-04-28 NOTE — NUR
Gave patient Motrin at 1352. Pain is 7/10. 



Will montior pain and reassess patient. 



Call light in reach. Bed in lowest position.

## 2020-04-28 NOTE — NUR
Patient A&Ox4. No s/s of distress. Patient is complaining of pain in his foot. Will 
administer pain medication. Will call doctor about possible discharge today. Bed in lowest 
position. Call light in reach. Will continue to monitor patient throughout shift.

## 2020-04-28 NOTE — NUR
RN medsurtrinity notes

Pt is complaining of pain on right foot. Administered dilaudid 1mg/1 ml/iv push as ordered 
for pain. /96, pulse 73. Safety precautions is maintained. Will continue to monitor.

## 2020-04-28 NOTE — NUR
Dilaudid 1mg given at 10AM. 



Documentation is done later because was unable to scan medication and bar code.

## 2020-04-28 NOTE — NUR
RN medsurg closing notes

Pt is resting in bed comfortably. Pt is alert and orientedX4. Respiration is normal in room 
air. No SOB. No S/S of distress noted. IV sites at LAC# 20 is clean, intact, patent and SL. 
Routine meds were given as ordered. Wound care provided as ordered. Kept Pt clean, dry and 
comfortable. Safety precautions is maintained. Bed at low position, brakes locked, side 
rails upX2, bed alarm is on and call light is within reach. Will endorse to morning nurse 
for PRATEEK.

## 2020-04-29 VITALS — SYSTOLIC BLOOD PRESSURE: 170 MMHG | DIASTOLIC BLOOD PRESSURE: 93 MMHG

## 2020-04-29 VITALS — SYSTOLIC BLOOD PRESSURE: 136 MMHG | DIASTOLIC BLOOD PRESSURE: 78 MMHG

## 2020-04-29 LAB
BASOPHILS # BLD AUTO: 0 /CMM (ref 0–0.2)
BASOPHILS NFR BLD AUTO: 0.4 % (ref 0–2)
BUN SERPL-MCNC: 12 MG/DL (ref 7–18)
CALCIUM SERPL-MCNC: 8.3 MG/DL (ref 8.5–10.1)
CHLORIDE SERPL-SCNC: 105 MMOL/L (ref 98–107)
CO2 SERPL-SCNC: 28 MMOL/L (ref 21–32)
CREAT SERPL-MCNC: 0.9 MG/DL (ref 0.6–1.3)
EOSINOPHIL NFR BLD AUTO: 6.1 % (ref 0–6)
GLUCOSE SERPL-MCNC: 250 MG/DL (ref 74–106)
HCT VFR BLD AUTO: 37 % (ref 39–51)
HGB BLD-MCNC: 12.4 G/DL (ref 13.5–17.5)
LYMPHOCYTES NFR BLD AUTO: 1.8 /CMM (ref 0.8–4.8)
LYMPHOCYTES NFR BLD AUTO: 24.5 % (ref 20–44)
MAGNESIUM SERPL-MCNC: 2 MG/DL (ref 1.8–2.4)
MCHC RBC AUTO-ENTMCNC: 34 G/DL (ref 31–36)
MCV RBC AUTO: 86 FL (ref 80–96)
MONOCYTES NFR BLD AUTO: 0.5 /CMM (ref 0.1–1.3)
MONOCYTES NFR BLD AUTO: 6.2 % (ref 2–12)
NEUTROPHILS # BLD AUTO: 4.6 /CMM (ref 1.8–8.9)
NEUTROPHILS NFR BLD AUTO: 62.8 % (ref 43–81)
PHOSPHATE SERPL-MCNC: 3.5 MG/DL (ref 2.5–4.9)
PLATELET # BLD AUTO: 321 /CMM (ref 150–450)
POTASSIUM SERPL-SCNC: 4.6 MMOL/L (ref 3.5–5.1)
RBC # BLD AUTO: 4.26 MIL/UL (ref 4.5–6)
SODIUM SERPL-SCNC: 138 MMOL/L (ref 136–145)
WBC NRBC COR # BLD AUTO: 7.4 K/UL (ref 4.3–11)

## 2020-04-29 RX ADMIN — Medication SCH EACH: at 11:41

## 2020-04-29 RX ADMIN — INSULIN HUMAN PRN UNIT: 100 INJECTION, SOLUTION PARENTERAL at 17:01

## 2020-04-29 RX ADMIN — METOPROLOL SUCCINATE SCH MG: 25 TABLET, EXTENDED RELEASE ORAL at 08:37

## 2020-04-29 RX ADMIN — Medication SCH EACH: at 21:15

## 2020-04-29 RX ADMIN — HYDROMORPHONE HYDROCHLORIDE PRN MG: 1 INJECTION, SOLUTION INTRAMUSCULAR; INTRAVENOUS; SUBCUTANEOUS at 22:25

## 2020-04-29 RX ADMIN — PREGABALIN SCH MG: 100 CAPSULE ORAL at 09:29

## 2020-04-29 RX ADMIN — HEPARIN SODIUM SCH UNITS: 5000 INJECTION INTRAVENOUS; SUBCUTANEOUS at 21:13

## 2020-04-29 RX ADMIN — MUPIROCIN SCH APPLIC: 20 OINTMENT TOPICAL at 21:30

## 2020-04-29 RX ADMIN — HUMAN INSULIN PRN UNIT: 100 INJECTION, SOLUTION SUBCUTANEOUS at 06:57

## 2020-04-29 RX ADMIN — HYDROMORPHONE HYDROCHLORIDE PRN MG: 1 INJECTION, SOLUTION INTRAMUSCULAR; INTRAVENOUS; SUBCUTANEOUS at 10:50

## 2020-04-29 RX ADMIN — PREGABALIN SCH MG: 100 CAPSULE ORAL at 16:48

## 2020-04-29 RX ADMIN — DEXTROSE MONOHYDRATE SCH MLS/HR: 50 INJECTION, SOLUTION INTRAVENOUS at 13:30

## 2020-04-29 RX ADMIN — MUPIROCIN SCH GM: 20 OINTMENT TOPICAL at 09:38

## 2020-04-29 RX ADMIN — CLONIDINE HYDROCHLORIDE PRN MG: 0.1 TABLET ORAL at 16:48

## 2020-04-29 RX ADMIN — DEXTROSE MONOHYDRATE SCH MLS/HR: 50 INJECTION, SOLUTION INTRAVENOUS at 05:13

## 2020-04-29 RX ADMIN — HYDROMORPHONE HYDROCHLORIDE PRN MG: 1 INJECTION, SOLUTION INTRAMUSCULAR; INTRAVENOUS; SUBCUTANEOUS at 05:13

## 2020-04-29 RX ADMIN — HUMAN INSULIN PRN UNIT: 100 INJECTION, SOLUTION SUBCUTANEOUS at 11:42

## 2020-04-29 RX ADMIN — ATORVASTATIN CALCIUM SCH MG: 40 TABLET, FILM COATED ORAL at 21:15

## 2020-04-29 RX ADMIN — NICOTINE SCH MG: 14 PATCH TRANSDERMAL at 08:36

## 2020-04-29 RX ADMIN — HUMAN INSULIN PRN UNIT: 100 INJECTION, SOLUTION SUBCUTANEOUS at 21:15

## 2020-04-29 RX ADMIN — DEXTROSE MONOHYDRATE SCH MLS/HR: 50 INJECTION, SOLUTION INTRAVENOUS at 21:15

## 2020-04-29 RX ADMIN — Medication SCH EACH: at 06:55

## 2020-04-29 RX ADMIN — ASPIRIN 81 MG SCH MG: 81 TABLET ORAL at 08:37

## 2020-04-29 RX ADMIN — HEPARIN SODIUM SCH UNITS: 5000 INJECTION INTRAVENOUS; SUBCUTANEOUS at 08:40

## 2020-04-29 RX ADMIN — Medication SCH EACH: at 16:48

## 2020-04-29 RX ADMIN — HYDROMORPHONE HYDROCHLORIDE PRN MG: 1 INJECTION, SOLUTION INTRAMUSCULAR; INTRAVENOUS; SUBCUTANEOUS at 16:49

## 2020-04-29 RX ADMIN — PREGABALIN SCH MG: 100 CAPSULE ORAL at 13:30

## 2020-04-29 NOTE — NUR
RN close notes



Patient is laying in bed. Bed is in lowest locked position with side rails up x2. No 
complaints of pain at the moment. Call light is within reach. No SOB/ acute respiratory 
distress noted. All due meds given. Will endorse to AM nurse.

## 2020-04-29 NOTE — NUR
MS RN OPENING NOTES



RECEIVED PATIENT IN BED ALERT AND ORIENTED X 3. VERBALLY RESPONSIVE AND ABLE TO FOLLOW 
DIRECTIONS. BREATHING REGULAR AND UNLABORED ON ROOM AIR. RIGHT HAND G22 IV LINE INTACT AND 
PATENT, FLUSHING WELL WITH NO BLEEDING OR S/S OF INFILTRATION NOTED. RIGHT FOOT WOUND 
OBSERVED WITH INTACT CLEAN DRESSING. NO COMPLAINTS OF PAIN/DISCOMFORT REPORTED AT THIS TIME. 
BED LOW AND LOCKED ON SEMI FOWLERS POSITION. CALL LIGHT IN REACH. WILL CONTINUE TO MONITOR.

## 2020-04-29 NOTE — NUR
TELE RN NOTES



BS 272mg/dl, 6UNITS REGULAR INSULIN GIVEN SQ. SNACKS PROVIDED ON BEDSIDE. WILL CONTINUE TO 
MONITOR.

## 2020-04-29 NOTE — NUR
MS RN CLOSING NOTE



PATIENT IN BED RESTING COMFORTABLY. PATIENT IN NO ACUTE DISTRESS. NO SOB NOTED. PATIENT 
BREATHING IS EVEN AND UNLABORED. PATIENT BED ALARM IS ON. SAFETY PRECAUTIONS IN PLACE. 
PATIENT STATES NO PAIN AT THIS TIME. WOUND CARE PROVIDED AS ORDERED. PATIENT KEPT CLEAN, DRY 
AND COMFORTABLE THROUGHOUT SHIFT. PATIENT BED IS LOCKED AND IN LOWEST POSITION. CALL LIGHT 
WITHIN REACH. WILL ENDORSE CARE TO PM SHIFT FOR PRATEEK.

## 2020-04-29 NOTE — NUR
MS RN NOTES



COMPLAINED OF 8/10 RIGHT FOOT PAIN, DILAUDID 1MG GIVEN VIA IV PUSH. NON-PHARMACOLOGICAL 
INTERVENTIONS PROVIDED. WILL CONTINUE TO MONITOR.

## 2020-04-29 NOTE — NUR
MS RN OPENING NOTE



PATIENT IN BED RESTING COMFORTABLY. PATIENT IN NO ACUTE DISTRESS. NO SOB NOTED. PATIENT 
BREATHING IS EVEN AND UNLABORED. PATIENT BED ALARM IS ON. SAFETY PRECAUTIONS IN PLACE. 
PATIENT STATES NO PAIN AT THIS TIME. PATIENT BED IS LOCKED AND IN LOWEST POSITION. CALL 
LIGHT WITHIN REACH. WILL CONTINUE TO MONITOR.

## 2020-04-30 VITALS — DIASTOLIC BLOOD PRESSURE: 85 MMHG | SYSTOLIC BLOOD PRESSURE: 187 MMHG

## 2020-04-30 VITALS — SYSTOLIC BLOOD PRESSURE: 147 MMHG | DIASTOLIC BLOOD PRESSURE: 91 MMHG

## 2020-04-30 LAB
BASOPHILS # BLD AUTO: 0 /CMM (ref 0–0.2)
BASOPHILS NFR BLD AUTO: 0.4 % (ref 0–2)
BUN SERPL-MCNC: 12 MG/DL (ref 7–18)
CALCIUM SERPL-MCNC: 8 MG/DL (ref 8.5–10.1)
CHLORIDE SERPL-SCNC: 102 MMOL/L (ref 98–107)
CO2 SERPL-SCNC: 27 MMOL/L (ref 21–32)
CREAT SERPL-MCNC: 1 MG/DL (ref 0.6–1.3)
EOSINOPHIL NFR BLD AUTO: 6.9 % (ref 0–6)
GLUCOSE SERPL-MCNC: 263 MG/DL (ref 74–106)
HCT VFR BLD AUTO: 36 % (ref 39–51)
HGB BLD-MCNC: 12.1 G/DL (ref 13.5–17.5)
LYMPHOCYTES NFR BLD AUTO: 2.1 /CMM (ref 0.8–4.8)
LYMPHOCYTES NFR BLD AUTO: 27.7 % (ref 20–44)
MAGNESIUM SERPL-MCNC: 1.9 MG/DL (ref 1.8–2.4)
MCHC RBC AUTO-ENTMCNC: 33 G/DL (ref 31–36)
MCV RBC AUTO: 86 FL (ref 80–96)
MONOCYTES NFR BLD AUTO: 0.5 /CMM (ref 0.1–1.3)
MONOCYTES NFR BLD AUTO: 6 % (ref 2–12)
NEUTROPHILS # BLD AUTO: 4.6 /CMM (ref 1.8–8.9)
NEUTROPHILS NFR BLD AUTO: 59 % (ref 43–81)
PHOSPHATE SERPL-MCNC: 3.7 MG/DL (ref 2.5–4.9)
PLATELET # BLD AUTO: 330 /CMM (ref 150–450)
POTASSIUM SERPL-SCNC: 3.5 MMOL/L (ref 3.5–5.1)
RBC # BLD AUTO: 4.24 MIL/UL (ref 4.5–6)
SODIUM SERPL-SCNC: 138 MMOL/L (ref 136–145)
WBC NRBC COR # BLD AUTO: 7.7 K/UL (ref 4.3–11)

## 2020-04-30 PROCEDURE — 05HY33Z INSERTION OF INFUSION DEVICE INTO UPPER VEIN, PERCUTANEOUS APPROACH: ICD-10-PCS | Performed by: NURSE PRACTITIONER

## 2020-04-30 RX ADMIN — Medication SCH EACH: at 06:31

## 2020-04-30 RX ADMIN — DEXTROSE MONOHYDRATE SCH MLS/HR: 50 INJECTION, SOLUTION INTRAVENOUS at 04:37

## 2020-04-30 RX ADMIN — ASPIRIN 81 MG SCH MG: 81 TABLET ORAL at 09:57

## 2020-04-30 RX ADMIN — METOPROLOL SUCCINATE SCH MG: 25 TABLET, EXTENDED RELEASE ORAL at 09:57

## 2020-04-30 RX ADMIN — INSULIN HUMAN PRN UNIT: 100 INJECTION, SOLUTION PARENTERAL at 11:46

## 2020-04-30 RX ADMIN — HYDROMORPHONE HYDROCHLORIDE PRN MG: 1 INJECTION, SOLUTION INTRAMUSCULAR; INTRAVENOUS; SUBCUTANEOUS at 14:55

## 2020-04-30 RX ADMIN — HYDROMORPHONE HYDROCHLORIDE PRN MG: 1 INJECTION, SOLUTION INTRAMUSCULAR; INTRAVENOUS; SUBCUTANEOUS at 04:57

## 2020-04-30 RX ADMIN — INSULIN HUMAN PRN UNIT: 100 INJECTION, SOLUTION PARENTERAL at 06:32

## 2020-04-30 RX ADMIN — PREGABALIN SCH MG: 100 CAPSULE ORAL at 09:57

## 2020-04-30 RX ADMIN — PREGABALIN SCH MG: 100 CAPSULE ORAL at 13:39

## 2020-04-30 RX ADMIN — IBUPROFEN PRN MG: 400 TABLET, FILM COATED ORAL at 03:48

## 2020-04-30 RX ADMIN — NICOTINE SCH MG: 14 PATCH TRANSDERMAL at 09:57

## 2020-04-30 RX ADMIN — MUPIROCIN SCH APPLIC: 20 OINTMENT TOPICAL at 09:59

## 2020-04-30 RX ADMIN — Medication SCH EACH: at 11:46

## 2020-04-30 RX ADMIN — DEXTROSE MONOHYDRATE SCH MLS/HR: 50 INJECTION, SOLUTION INTRAVENOUS at 13:38

## 2020-04-30 RX ADMIN — HEPARIN SODIUM SCH UNITS: 5000 INJECTION INTRAVENOUS; SUBCUTANEOUS at 09:58

## 2020-04-30 NOTE — NUR
PAtient cleared for d/c to home with home health to continue antibiotic therapy. Midline to 
the MARY g 18 inserted prior discharge. Midline flushing well with good blood return. Patient 
refused wound care and d/c pictures. Patient educated on diabetic diet and needs to f/u with 
wound cleaning and PCP.Patient verbalized understanding. IV med s/e provided. Patient sighed 
d/c papers and valuable form. All belongings with the patient including home meds and cell 
phone. Patient safely transferred to [facility] via wheelchair and picked up by daughter

## 2020-04-30 NOTE — NUR
MS RN CLOSING NOTES



PATIENT IN BED ALERT AND ORIENTED X 3. AFEBRILE WITH NO S/S OF DISTRESS OBSERVED. VERBALLY 
RESPONSIVE AND ABLE TO FOLLOW DIRECTIONS. RIGHT HAND G22 IV LINE PATENT AND FLUSHING WELL. 
RIGHT FOOT WOUND TREATMENT PROVIDED. NO COMPLAINTS OF PAIN/DISCOMFORT REPORTED AT THIS TIME. 
BED LOW AND LOCKED ON SEMI FOWLERS POSITION. CALL LIGHT IN REACH. WILL ENDORSE TO MORNING 
SHIFT FOR PRATEEK.

## 2020-05-07 ENCOUNTER — HOSPITAL ENCOUNTER (OUTPATIENT)
Dept: HOSPITAL 54 - WOU | Age: 45
Discharge: HOME | End: 2020-05-07
Attending: SURGERY
Payer: MEDICARE

## 2020-05-07 DIAGNOSIS — E10.65: ICD-10-CM

## 2020-05-07 DIAGNOSIS — E10.621: Primary | ICD-10-CM

## 2020-05-07 DIAGNOSIS — L97.512: ICD-10-CM

## 2020-05-07 DIAGNOSIS — E66.9: ICD-10-CM

## 2020-05-07 DIAGNOSIS — Z79.4: ICD-10-CM

## 2020-05-14 ENCOUNTER — HOSPITAL ENCOUNTER (OUTPATIENT)
Dept: HOSPITAL 54 - WOU | Age: 45
Discharge: HOME | End: 2020-05-14
Attending: STUDENT IN AN ORGANIZED HEALTH CARE EDUCATION/TRAINING PROGRAM
Payer: MEDICARE

## 2020-05-14 DIAGNOSIS — Z79.899: ICD-10-CM

## 2020-05-14 DIAGNOSIS — E10.21: ICD-10-CM

## 2020-05-14 DIAGNOSIS — F17.210: ICD-10-CM

## 2020-05-14 DIAGNOSIS — E10.610: ICD-10-CM

## 2020-05-14 DIAGNOSIS — L97.512: ICD-10-CM

## 2020-05-14 DIAGNOSIS — I10: ICD-10-CM

## 2020-05-14 DIAGNOSIS — Z79.4: ICD-10-CM

## 2020-05-14 DIAGNOSIS — E10.621: Primary | ICD-10-CM

## 2020-07-26 ENCOUNTER — HOSPITAL ENCOUNTER (INPATIENT)
Dept: HOSPITAL 54 - ER | Age: 45
LOS: 5 days | Discharge: HOME HEALTH | DRG: 570 | End: 2020-07-31
Attending: INTERNAL MEDICINE | Admitting: NURSE PRACTITIONER
Payer: MEDICARE

## 2020-07-26 VITALS — BODY MASS INDEX: 35.81 KG/M2 | HEIGHT: 74 IN | WEIGHT: 279 LBS

## 2020-07-26 VITALS — DIASTOLIC BLOOD PRESSURE: 113 MMHG | SYSTOLIC BLOOD PRESSURE: 175 MMHG

## 2020-07-26 DIAGNOSIS — G89.29: ICD-10-CM

## 2020-07-26 DIAGNOSIS — F12.90: ICD-10-CM

## 2020-07-26 DIAGNOSIS — N13.9: ICD-10-CM

## 2020-07-26 DIAGNOSIS — E11.621: ICD-10-CM

## 2020-07-26 DIAGNOSIS — E78.1: ICD-10-CM

## 2020-07-26 DIAGNOSIS — Z72.0: ICD-10-CM

## 2020-07-26 DIAGNOSIS — E66.01: ICD-10-CM

## 2020-07-26 DIAGNOSIS — D68.59: ICD-10-CM

## 2020-07-26 DIAGNOSIS — E11.42: ICD-10-CM

## 2020-07-26 DIAGNOSIS — Z91.14: ICD-10-CM

## 2020-07-26 DIAGNOSIS — N17.0: ICD-10-CM

## 2020-07-26 DIAGNOSIS — Z79.899: ICD-10-CM

## 2020-07-26 DIAGNOSIS — E78.5: ICD-10-CM

## 2020-07-26 DIAGNOSIS — E11.610: ICD-10-CM

## 2020-07-26 DIAGNOSIS — L03.115: Primary | ICD-10-CM

## 2020-07-26 DIAGNOSIS — E44.1: ICD-10-CM

## 2020-07-26 DIAGNOSIS — Z79.82: ICD-10-CM

## 2020-07-26 DIAGNOSIS — D64.9: ICD-10-CM

## 2020-07-26 DIAGNOSIS — Z83.3: ICD-10-CM

## 2020-07-26 DIAGNOSIS — E11.628: ICD-10-CM

## 2020-07-26 DIAGNOSIS — E11.65: ICD-10-CM

## 2020-07-26 DIAGNOSIS — L97.519: ICD-10-CM

## 2020-07-26 LAB
BASOPHILS # BLD AUTO: 0 /CMM (ref 0–0.2)
BASOPHILS NFR BLD AUTO: 0.5 % (ref 0–2)
BUN SERPL-MCNC: 20 MG/DL (ref 7–18)
CALCIUM SERPL-MCNC: 8.4 MG/DL (ref 8.5–10.1)
CHLORIDE SERPL-SCNC: 99 MMOL/L (ref 98–107)
CO2 SERPL-SCNC: 27 MMOL/L (ref 21–32)
CREAT SERPL-MCNC: 1.7 MG/DL (ref 0.6–1.3)
EOSINOPHIL NFR BLD AUTO: 3.5 % (ref 0–6)
GLUCOSE SERPL-MCNC: 495 MG/DL (ref 74–106)
HCT VFR BLD AUTO: 42 % (ref 39–51)
HGB BLD-MCNC: 13.7 G/DL (ref 13.5–17.5)
LYMPHOCYTES NFR BLD AUTO: 2 /CMM (ref 0.8–4.8)
LYMPHOCYTES NFR BLD AUTO: 21.4 % (ref 20–44)
MCHC RBC AUTO-ENTMCNC: 33 G/DL (ref 31–36)
MCV RBC AUTO: 88 FL (ref 80–96)
MONOCYTES NFR BLD AUTO: 0.5 /CMM (ref 0.1–1.3)
MONOCYTES NFR BLD AUTO: 5.7 % (ref 2–12)
NEUTROPHILS # BLD AUTO: 6.5 /CMM (ref 1.8–8.9)
NEUTROPHILS NFR BLD AUTO: 68.9 % (ref 43–81)
PLATELET # BLD AUTO: 319 /CMM (ref 150–450)
POTASSIUM SERPL-SCNC: 4.3 MMOL/L (ref 3.5–5.1)
RBC # BLD AUTO: 4.73 MIL/UL (ref 4.5–6)
SODIUM SERPL-SCNC: 134 MMOL/L (ref 136–145)
WBC NRBC COR # BLD AUTO: 9.4 K/UL (ref 4.3–11)

## 2020-07-26 PROCEDURE — G0378 HOSPITAL OBSERVATION PER HR: HCPCS

## 2020-07-26 PROCEDURE — A6403 STERILE GAUZE>16 <= 48 SQ IN: HCPCS

## 2020-07-26 RX ADMIN — HEPARIN SODIUM SCH UNITS: 5000 INJECTION INTRAVENOUS; SUBCUTANEOUS at 22:28

## 2020-07-26 RX ADMIN — INSULIN HUMAN PRN UNIT: 100 INJECTION, SOLUTION PARENTERAL at 22:42

## 2020-07-26 RX ADMIN — Medication SCH EACH: at 22:46

## 2020-07-26 RX ADMIN — SODIUM CHLORIDE PRN MLS/HR: 9 INJECTION, SOLUTION INTRAVENOUS at 23:11

## 2020-07-26 NOTE — NUR
PATIENT CAME TO ER BED 9 C/O RIGHT FOOT PAIN SINCE 2x MONTHS AGO. PATIENT 
STATES THAT HE WAS WALKING HIS DOG WHEN HE HAD STEPPED ON A SHARP PEBBLE WHILE 
WEARING SLIPPERS WHICH HAS NOT HEALED UNTIL NOW. PATIENT STATES THAT HAS 
DIABETIC NEUROPATHY ON HIS FOOT. HE HAS SEEN A WOUND CARE CENTER, BUT HAS 
STOPPED GOING BECAUSE HE "HAD TO LEAVE TOWN". PATIENT IS NOT ON ANY 
ANTIBIOTICS. STATES HE FEELS THROBBING PAIN 7/10 CURRENTLY. NO DRAINAGE AT THE 
SITE. AAOX4. NO SOB. BREATHING EVENLY AND UNLABORED ON ROOM AIR.

## 2020-07-26 NOTE — NUR
MS/RN ADMISSION NOTE 



Received patient via [employer] @ 8038. Patient is A/O x4, very pleasant. PERRLA. No JVD. Pulses 
2+. Breathing even, clear, unlabored. No signs of acute distress or SOB. Patient c/o pain 
level 7 aching, throbbing in lower back. Skin warm, pink, dry, appropriate for ethnicity. 
Ulceration on plantar surface of right foot. No drainage noted at this time. Healed surgical 
incision approximately 2 inches, noted on lower back. Sensation intact in bilateral upper 
extremity. Numbness noted in bilateral lower extremities. Abdomen round, soft, 
non-distended. Bowel sounds hypoactive in upper quadrants, normoactive in lower quadrants. 
Last bowel movement 7/26. Pt voids via toilet. Urine output clear, yellow, no sediment. Able 
to move extremities well,  strength 4+. IV site right forearm 20g running NS @ 75 ml/hr, 
patent and intact. No signs of infiltration. Bed in low position, wheels locked, side rails 
up x2, call light within reach.

## 2020-07-27 VITALS — DIASTOLIC BLOOD PRESSURE: 96 MMHG | SYSTOLIC BLOOD PRESSURE: 159 MMHG

## 2020-07-27 VITALS — SYSTOLIC BLOOD PRESSURE: 144 MMHG | DIASTOLIC BLOOD PRESSURE: 94 MMHG

## 2020-07-27 VITALS — DIASTOLIC BLOOD PRESSURE: 100 MMHG | SYSTOLIC BLOOD PRESSURE: 163 MMHG

## 2020-07-27 VITALS — SYSTOLIC BLOOD PRESSURE: 154 MMHG | DIASTOLIC BLOOD PRESSURE: 91 MMHG

## 2020-07-27 VITALS — SYSTOLIC BLOOD PRESSURE: 159 MMHG | DIASTOLIC BLOOD PRESSURE: 96 MMHG

## 2020-07-27 PROCEDURE — 0JBQ0ZZ EXCISION OF RIGHT FOOT SUBCUTANEOUS TISSUE AND FASCIA, OPEN APPROACH: ICD-10-PCS | Performed by: PODIATRIST

## 2020-07-27 RX ADMIN — HUMAN INSULIN PRN UNIT: 100 INJECTION, SOLUTION SUBCUTANEOUS at 21:10

## 2020-07-27 RX ADMIN — PIPERACILLIN SODIUM AND TAZOBACTAM SODIUM SCH MLS/HR: .375; 3 INJECTION, POWDER, LYOPHILIZED, FOR SOLUTION INTRAVENOUS at 17:04

## 2020-07-27 RX ADMIN — Medication PRN EACH: at 11:46

## 2020-07-27 RX ADMIN — INSULIN HUMAN PRN UNIT: 100 INJECTION, SOLUTION PARENTERAL at 17:12

## 2020-07-27 RX ADMIN — Medication SCH EACH: at 06:42

## 2020-07-27 RX ADMIN — DEXTROSE MONOHYDRATE SCH MLS/HR: 50 INJECTION, SOLUTION INTRAVENOUS at 20:30

## 2020-07-27 RX ADMIN — Medication PRN EACH: at 06:42

## 2020-07-27 RX ADMIN — PREGABALIN SCH MG: 100 CAPSULE ORAL at 08:28

## 2020-07-27 RX ADMIN — HEPARIN SODIUM SCH UNITS: 5000 INJECTION INTRAVENOUS; SUBCUTANEOUS at 08:31

## 2020-07-27 RX ADMIN — SODIUM CHLORIDE PRN MLS/HR: 9 INJECTION, SOLUTION INTRAVENOUS at 16:44

## 2020-07-27 RX ADMIN — DEXTROSE MONOHYDRATE SCH MLS/HR: 50 INJECTION, SOLUTION INTRAVENOUS at 09:27

## 2020-07-27 RX ADMIN — Medication PRN EACH: at 00:44

## 2020-07-27 RX ADMIN — Medication SCH EACH: at 17:12

## 2020-07-27 RX ADMIN — PREGABALIN SCH MG: 100 CAPSULE ORAL at 13:30

## 2020-07-27 RX ADMIN — Medication SCH EACH: at 11:11

## 2020-07-27 RX ADMIN — ASPIRIN 81 MG SCH MG: 81 TABLET ORAL at 08:28

## 2020-07-27 RX ADMIN — INSULIN HUMAN PRN UNIT: 100 INJECTION, SOLUTION PARENTERAL at 11:13

## 2020-07-27 RX ADMIN — HEPARIN SODIUM SCH UNITS: 5000 INJECTION INTRAVENOUS; SUBCUTANEOUS at 20:31

## 2020-07-27 RX ADMIN — PREGABALIN SCH MG: 100 CAPSULE ORAL at 16:44

## 2020-07-27 RX ADMIN — PIPERACILLIN SODIUM AND TAZOBACTAM SODIUM SCH MLS/HR: .375; 3 INJECTION, POWDER, LYOPHILIZED, FOR SOLUTION INTRAVENOUS at 11:03

## 2020-07-27 RX ADMIN — METOPROLOL SUCCINATE SCH MG: 25 TABLET, EXTENDED RELEASE ORAL at 08:29

## 2020-07-27 RX ADMIN — ATORVASTATIN CALCIUM SCH MG: 40 TABLET, FILM COATED ORAL at 00:27

## 2020-07-27 RX ADMIN — INSULIN HUMAN PRN UNIT: 100 INJECTION, SOLUTION PARENTERAL at 06:31

## 2020-07-27 RX ADMIN — ATORVASTATIN CALCIUM SCH MG: 40 TABLET, FILM COATED ORAL at 21:11

## 2020-07-27 RX ADMIN — Medication PRN EACH: at 18:02

## 2020-07-27 RX ADMIN — Medication SCH EACH: at 21:04

## 2020-07-27 NOTE — NUR
RN NOTES



Received patient in bed resting comfortably in moderate high back rest. A/O x4, Breathing 
even, clear, unlabored. No signs of acute distress or SOB noted at this time. IV fluids on 
RFA #20 with NS running @75 ML/HR. Ulceration on plantar surface of right foot. Multiple 
scabs noted on left leg. Safety measures in place, Bed in low position, wheels locked, side 
rails up x2, call light within reach. Will continue to monitor.

## 2020-07-27 NOTE — NUR
MS/RN NOTE 



Patient c/o pain level 8 in low back. Throbbing and aching. Gave prescribed norco as 
ordered. VSS. Will continue to monitor.

## 2020-07-27 NOTE — NUR
MS/RN CLOSING NOTE 



Awake in bed. Patient is A/O x4, very pleasant. PERRLA. No JVD. Pulses 2+. Breathing even, 
clear, unlabored. No signs of acute distress or SOB. Patient c/o pain level 7 aching, 
throbbing in lower back. Skin warm, pink, dry, appropriate for ethnicity. Ulceration on 
plantar surface of right foot. Multiple scabs noted on left leg. Bed in low position, wheels 
locked, side rails up x2, call light within reach. Will endorse to oncoming nurse.

## 2020-07-27 NOTE — NUR
WOUND CARE CONSULT: PT REFUSED SKIN ASSESSMENT AND STATED WANTS TO SLEEP. PER NURSING STAFF 
THERE IS A FOOT WOUND, PRESENT ON ADMISSION. DR MUÑOZ FOLLOWS PT FOR WOUND. DR MUÑOZ 
NOTIFIED OF PT ADMISSION. WILL SEE PRN.

## 2020-07-27 NOTE — NUR
RN NOTES



Patient in bed resting comfortably in moderate high back rest. A/O x4, Breathing even, 
clear, unlabored. No signs of acute distress or SOB noted throughout the shift. IV fluids on 
RFA #20 with NS running @75 ML/HR. S/P DEBRIDEMENT on right foot. Safety measures in place, 
Bed in low position, wheels locked, side rails up x2, call light within reach. Will endorse 
to night shift nurse for ange.

## 2020-07-27 NOTE — NUR
RECEICED IN BED ALERT AND ORIENTATED X4.  WATCHING TV ASKING FOR A SNACK.  STATES HIS BACK 
IS WERE THE PAIN IS AND NORCO WAS GIVEN "HELPS SOMEWHAT"

## 2020-07-28 VITALS — DIASTOLIC BLOOD PRESSURE: 80 MMHG | SYSTOLIC BLOOD PRESSURE: 139 MMHG

## 2020-07-28 VITALS — SYSTOLIC BLOOD PRESSURE: 150 MMHG | DIASTOLIC BLOOD PRESSURE: 90 MMHG

## 2020-07-28 VITALS — SYSTOLIC BLOOD PRESSURE: 159 MMHG | DIASTOLIC BLOOD PRESSURE: 108 MMHG

## 2020-07-28 VITALS — DIASTOLIC BLOOD PRESSURE: 108 MMHG | SYSTOLIC BLOOD PRESSURE: 170 MMHG

## 2020-07-28 LAB
BASOPHILS # BLD AUTO: 0 /CMM (ref 0–0.2)
BASOPHILS NFR BLD AUTO: 0.5 % (ref 0–2)
BUN SERPL-MCNC: 13 MG/DL (ref 7–18)
CALCIUM SERPL-MCNC: 8.1 MG/DL (ref 8.5–10.1)
CHLORIDE SERPL-SCNC: 105 MMOL/L (ref 98–107)
CO2 SERPL-SCNC: 27 MMOL/L (ref 21–32)
CREAT SERPL-MCNC: 1 MG/DL (ref 0.6–1.3)
EOSINOPHIL NFR BLD AUTO: 4.8 % (ref 0–6)
GLUCOSE SERPL-MCNC: 237 MG/DL (ref 74–106)
HCT VFR BLD AUTO: 37 % (ref 39–51)
HGB BLD-MCNC: 12.3 G/DL (ref 13.5–17.5)
LYMPHOCYTES NFR BLD AUTO: 2.2 /CMM (ref 0.8–4.8)
LYMPHOCYTES NFR BLD AUTO: 31.1 % (ref 20–44)
MAGNESIUM SERPL-MCNC: 2.1 MG/DL (ref 1.8–2.4)
MCHC RBC AUTO-ENTMCNC: 33 G/DL (ref 31–36)
MCV RBC AUTO: 86 FL (ref 80–96)
MONOCYTES NFR BLD AUTO: 0.5 /CMM (ref 0.1–1.3)
MONOCYTES NFR BLD AUTO: 7.2 % (ref 2–12)
NEUTROPHILS # BLD AUTO: 4 /CMM (ref 1.8–8.9)
NEUTROPHILS NFR BLD AUTO: 56.4 % (ref 43–81)
PHOSPHATE SERPL-MCNC: 3.2 MG/DL (ref 2.5–4.9)
PLATELET # BLD AUTO: 271 /CMM (ref 150–450)
POTASSIUM SERPL-SCNC: 4 MMOL/L (ref 3.5–5.1)
RBC # BLD AUTO: 4.29 MIL/UL (ref 4.5–6)
SODIUM SERPL-SCNC: 138 MMOL/L (ref 136–145)
WBC NRBC COR # BLD AUTO: 7.1 K/UL (ref 4.3–11)

## 2020-07-28 RX ADMIN — Medication SCH EACH: at 17:10

## 2020-07-28 RX ADMIN — PIPERACILLIN SODIUM AND TAZOBACTAM SODIUM SCH MLS/HR: .375; 3 INJECTION, POWDER, LYOPHILIZED, FOR SOLUTION INTRAVENOUS at 00:02

## 2020-07-28 RX ADMIN — HUMAN INSULIN PRN UNIT: 100 INJECTION, SOLUTION SUBCUTANEOUS at 21:49

## 2020-07-28 RX ADMIN — INSULIN HUMAN PRN UNIT: 100 INJECTION, SOLUTION PARENTERAL at 05:52

## 2020-07-28 RX ADMIN — PIPERACILLIN SODIUM AND TAZOBACTAM SODIUM SCH MLS/HR: .375; 3 INJECTION, POWDER, LYOPHILIZED, FOR SOLUTION INTRAVENOUS at 23:54

## 2020-07-28 RX ADMIN — DEXTROSE MONOHYDRATE SCH MLS/HR: 50 INJECTION, SOLUTION INTRAVENOUS at 08:59

## 2020-07-28 RX ADMIN — INSULIN HUMAN PRN UNIT: 100 INJECTION, SOLUTION PARENTERAL at 11:56

## 2020-07-28 RX ADMIN — HEPARIN SODIUM SCH UNITS: 5000 INJECTION INTRAVENOUS; SUBCUTANEOUS at 21:33

## 2020-07-28 RX ADMIN — Medication PRN EACH: at 05:43

## 2020-07-28 RX ADMIN — OXYCODONE HYDROCHLORIDE AND ACETAMINOPHEN PRN UDTAB: 5; 325 TABLET ORAL at 21:36

## 2020-07-28 RX ADMIN — OXYCODONE HYDROCHLORIDE AND ACETAMINOPHEN PRN UDTAB: 5; 325 TABLET ORAL at 17:01

## 2020-07-28 RX ADMIN — ATORVASTATIN CALCIUM SCH MG: 40 TABLET, FILM COATED ORAL at 21:31

## 2020-07-28 RX ADMIN — SODIUM CHLORIDE PRN MLS/HR: 9 INJECTION, SOLUTION INTRAVENOUS at 17:09

## 2020-07-28 RX ADMIN — ASPIRIN 81 MG SCH MG: 81 TABLET ORAL at 08:07

## 2020-07-28 RX ADMIN — Medication SCH EACH: at 07:08

## 2020-07-28 RX ADMIN — PIPERACILLIN SODIUM AND TAZOBACTAM SODIUM SCH MLS/HR: .375; 3 INJECTION, POWDER, LYOPHILIZED, FOR SOLUTION INTRAVENOUS at 05:36

## 2020-07-28 RX ADMIN — METOPROLOL SUCCINATE SCH MG: 25 TABLET, EXTENDED RELEASE ORAL at 08:08

## 2020-07-28 RX ADMIN — Medication SCH EACH: at 21:32

## 2020-07-28 RX ADMIN — PIPERACILLIN SODIUM AND TAZOBACTAM SODIUM SCH MLS/HR: .375; 3 INJECTION, POWDER, LYOPHILIZED, FOR SOLUTION INTRAVENOUS at 17:02

## 2020-07-28 RX ADMIN — HEPARIN SODIUM SCH UNITS: 5000 INJECTION INTRAVENOUS; SUBCUTANEOUS at 08:09

## 2020-07-28 RX ADMIN — Medication PRN EACH: at 15:49

## 2020-07-28 RX ADMIN — PREGABALIN SCH MG: 100 CAPSULE ORAL at 08:07

## 2020-07-28 RX ADMIN — Medication SCH EACH: at 11:55

## 2020-07-28 RX ADMIN — INSULIN HUMAN PRN UNIT: 100 INJECTION, SOLUTION PARENTERAL at 17:29

## 2020-07-28 RX ADMIN — PIPERACILLIN SODIUM AND TAZOBACTAM SODIUM SCH MLS/HR: .375; 3 INJECTION, POWDER, LYOPHILIZED, FOR SOLUTION INTRAVENOUS at 11:54

## 2020-07-28 RX ADMIN — PREGABALIN SCH MG: 100 CAPSULE ORAL at 12:53

## 2020-07-28 RX ADMIN — Medication PRN EACH: at 00:03

## 2020-07-28 RX ADMIN — PREGABALIN SCH MG: 100 CAPSULE ORAL at 17:01

## 2020-07-28 RX ADMIN — DEXTROSE MONOHYDRATE SCH MLS/HR: 50 INJECTION, SOLUTION INTRAVENOUS at 21:31

## 2020-07-28 NOTE — NUR
MS RN OPENING NOTES



Received patient A/O x4, awake on bed watching TV. On RA, denies any discomfort at this 
time. IVF infusing well via peripheral IV line LAC #20 with NS @ 75ml/hr as ordered. Pt is 
ambulatory to bathroom with steady gait, independent in ADL. Kept on bed clean, dry and 
comfortable. Call light within easy reach. Will continue to monitor accordingly.

## 2020-07-28 NOTE — NUR
ENDING NOTES:  MEDICATED X2 THRU THE NIGHT FOR BACK PAIN WITH NORCO AS ORDERED.  RIGHT FOOT 
DRESSING CDI.  ELEVATED ON A PILLOW.  SLEPT LITTLE THIS NIGHT, WATCHING TV

## 2020-07-28 NOTE — NUR
RN NOTES



Received Patient in bed resting comfortably in moderate high back rest. A/O x4, Breathing 
even, clear, unlabored. No signs of acute distress or SOB noted at this time. IV fluids on 
RFA #20 with NS running @75 ML/HR. Safety measures in place, Bed in low position, wheels 
locked, side rails up x2, call light within reach. Will continue to monitor.

## 2020-07-28 NOTE — NUR
RN NOTES



Patient in bed resting comfortably in moderate high back rest. A/O x4, Breathing even, 
clear, unlabored. No signs of acute distress or SOB noted throughout the shift. IV fluids on 
RFA #20 with NS running @75 ML/HR. Safety measures in place, Bed in low position, wheels 
locked, side rails up x2, call light within reach. Will endorse to night shift nurse for 
ange.

## 2020-07-28 NOTE — NUR
WOUND CARE CONSULT: PT FOLLOWED BY DPM FOR FOOT WOUND. PT DENIES ANY OTHER SKIN ISSUE AND 
REFUSED FULL SKIN ASSESSMENT. WILL SEE PRN. DEFER TO DPM FOR WOUND TREATMENT PLAN.

## 2020-07-29 VITALS — DIASTOLIC BLOOD PRESSURE: 89 MMHG | SYSTOLIC BLOOD PRESSURE: 145 MMHG

## 2020-07-29 VITALS — SYSTOLIC BLOOD PRESSURE: 144 MMHG | DIASTOLIC BLOOD PRESSURE: 91 MMHG

## 2020-07-29 VITALS — DIASTOLIC BLOOD PRESSURE: 86 MMHG | SYSTOLIC BLOOD PRESSURE: 166 MMHG

## 2020-07-29 LAB
BASOPHILS # BLD AUTO: 0 /CMM (ref 0–0.2)
BASOPHILS NFR BLD AUTO: 0.6 % (ref 0–2)
BUN SERPL-MCNC: 12 MG/DL (ref 7–18)
CALCIUM SERPL-MCNC: 8.1 MG/DL (ref 8.5–10.1)
CHLORIDE SERPL-SCNC: 106 MMOL/L (ref 98–107)
CO2 SERPL-SCNC: 25 MMOL/L (ref 21–32)
CREAT SERPL-MCNC: 0.9 MG/DL (ref 0.6–1.3)
EOSINOPHIL NFR BLD AUTO: 5.2 % (ref 0–6)
GLUCOSE SERPL-MCNC: 239 MG/DL (ref 74–106)
HCT VFR BLD AUTO: 38 % (ref 39–51)
HGB BLD-MCNC: 12.5 G/DL (ref 13.5–17.5)
LYMPHOCYTES NFR BLD AUTO: 2.3 /CMM (ref 0.8–4.8)
LYMPHOCYTES NFR BLD AUTO: 35.7 % (ref 20–44)
MAGNESIUM SERPL-MCNC: 2.2 MG/DL (ref 1.8–2.4)
MCHC RBC AUTO-ENTMCNC: 33 G/DL (ref 31–36)
MCV RBC AUTO: 86 FL (ref 80–96)
MONOCYTES NFR BLD AUTO: 0.4 /CMM (ref 0.1–1.3)
MONOCYTES NFR BLD AUTO: 6.3 % (ref 2–12)
NEUTROPHILS # BLD AUTO: 3.4 /CMM (ref 1.8–8.9)
NEUTROPHILS NFR BLD AUTO: 52.2 % (ref 43–81)
PHOSPHATE SERPL-MCNC: 3.2 MG/DL (ref 2.5–4.9)
PLATELET # BLD AUTO: 275 /CMM (ref 150–450)
POTASSIUM SERPL-SCNC: 3.8 MMOL/L (ref 3.5–5.1)
RBC # BLD AUTO: 4.35 MIL/UL (ref 4.5–6)
SODIUM SERPL-SCNC: 139 MMOL/L (ref 136–145)
WBC NRBC COR # BLD AUTO: 6.6 K/UL (ref 4.3–11)

## 2020-07-29 RX ADMIN — PIPERACILLIN SODIUM AND TAZOBACTAM SODIUM SCH MLS/HR: .375; 3 INJECTION, POWDER, LYOPHILIZED, FOR SOLUTION INTRAVENOUS at 06:14

## 2020-07-29 RX ADMIN — OXYCODONE HYDROCHLORIDE AND ACETAMINOPHEN PRN UDTAB: 5; 325 TABLET ORAL at 18:34

## 2020-07-29 RX ADMIN — OXYCODONE HYDROCHLORIDE AND ACETAMINOPHEN PRN UDTAB: 5; 325 TABLET ORAL at 23:08

## 2020-07-29 RX ADMIN — PIPERACILLIN SODIUM AND TAZOBACTAM SODIUM SCH MLS/HR: .375; 3 INJECTION, POWDER, LYOPHILIZED, FOR SOLUTION INTRAVENOUS at 11:44

## 2020-07-29 RX ADMIN — METFORMIN HYDROCHLORIDE SCH MG: 500 TABLET, FILM COATED ORAL at 17:28

## 2020-07-29 RX ADMIN — OXYCODONE HYDROCHLORIDE AND ACETAMINOPHEN PRN UDTAB: 5; 325 TABLET ORAL at 02:18

## 2020-07-29 RX ADMIN — Medication SCH EACH: at 21:22

## 2020-07-29 RX ADMIN — METFORMIN HYDROCHLORIDE SCH MG: 500 TABLET, FILM COATED ORAL at 09:33

## 2020-07-29 RX ADMIN — INSULIN HUMAN PRN UNIT: 100 INJECTION, SOLUTION PARENTERAL at 17:29

## 2020-07-29 RX ADMIN — HUMAN INSULIN PRN UNIT: 100 INJECTION, SOLUTION SUBCUTANEOUS at 21:34

## 2020-07-29 RX ADMIN — PREGABALIN SCH MG: 100 CAPSULE ORAL at 08:48

## 2020-07-29 RX ADMIN — Medication SCH EACH: at 06:37

## 2020-07-29 RX ADMIN — INSULIN HUMAN PRN UNIT: 100 INJECTION, SOLUTION PARENTERAL at 06:39

## 2020-07-29 RX ADMIN — METOPROLOL SUCCINATE SCH MG: 25 TABLET, EXTENDED RELEASE ORAL at 08:50

## 2020-07-29 RX ADMIN — ASPIRIN 81 MG SCH MG: 81 TABLET ORAL at 08:49

## 2020-07-29 RX ADMIN — OXYCODONE HYDROCHLORIDE AND ACETAMINOPHEN PRN UDTAB: 5; 325 TABLET ORAL at 06:38

## 2020-07-29 RX ADMIN — Medication PRN EACH: at 12:17

## 2020-07-29 RX ADMIN — INSULIN HUMAN PRN UNIT: 100 INJECTION, SOLUTION PARENTERAL at 11:46

## 2020-07-29 RX ADMIN — DEXTROSE MONOHYDRATE SCH MLS/HR: 50 INJECTION, SOLUTION INTRAVENOUS at 08:56

## 2020-07-29 RX ADMIN — ATORVASTATIN CALCIUM SCH MG: 40 TABLET, FILM COATED ORAL at 21:22

## 2020-07-29 RX ADMIN — HEPARIN SODIUM SCH UNITS: 5000 INJECTION INTRAVENOUS; SUBCUTANEOUS at 21:22

## 2020-07-29 RX ADMIN — PREGABALIN SCH MG: 100 CAPSULE ORAL at 17:27

## 2020-07-29 RX ADMIN — NICOTINE SCH MG: 14 PATCH TRANSDERMAL at 23:05

## 2020-07-29 RX ADMIN — DEXTROSE MONOHYDRATE SCH MLS/HR: 50 INJECTION, SOLUTION INTRAVENOUS at 21:22

## 2020-07-29 RX ADMIN — SODIUM CHLORIDE PRN MLS/HR: 9 INJECTION, SOLUTION INTRAVENOUS at 14:14

## 2020-07-29 RX ADMIN — PREGABALIN SCH MG: 100 CAPSULE ORAL at 12:17

## 2020-07-29 RX ADMIN — HEPARIN SODIUM SCH UNITS: 5000 INJECTION INTRAVENOUS; SUBCUTANEOUS at 08:49

## 2020-07-29 RX ADMIN — Medication SCH EACH: at 11:44

## 2020-07-29 RX ADMIN — Medication SCH EACH: at 17:31

## 2020-07-29 RX ADMIN — PIPERACILLIN SODIUM AND TAZOBACTAM SODIUM SCH MLS/HR: .375; 3 INJECTION, POWDER, LYOPHILIZED, FOR SOLUTION INTRAVENOUS at 17:31

## 2020-07-29 NOTE — NUR
MS RN CLOSING NOTES



Pt asleep on bed, on RA. No new complaints made. Medicated for pain, noted effective. All 
nursing needs attended, due meds given as ordered. Endorsed.

## 2020-07-29 NOTE — NUR
MS RN NOTES

PATIENT IN BED ALERT ORIENTED X 4. NO ACUTE DISTRESS NOTED. BREATHING UNLABORED. IV ACCESS 
PATENT AND INTACT, NO REDNESS, NO SWELLING NOTED. SAFETY MEASURES IN PLACE. CALL LIGHT 
WITHIN REACH. WILL CONTINUE TO MONITOR ACCORDINGLY.

## 2020-07-29 NOTE — NUR
MS RN NOTES

PATIENT IN BED ALERT ORIENTED X 4. NO ACUTE DISTRESS NOTED. BREATHING UNLABORED. IV ACCESS 
PATENT AND INTACT, NO REDNESS, NO SWELLING NOTED. NEEDS ATTENDED AND ANTICIPATED. KEPT 
COMFORTABLE. SAFETY MEASURES IN PLACE. CALL LIGHT WITHIN REACH. WILL ENDORSE TO NIGHT FOR 
CONTINUITY OF CARE

## 2020-07-30 VITALS — DIASTOLIC BLOOD PRESSURE: 86 MMHG | SYSTOLIC BLOOD PRESSURE: 138 MMHG

## 2020-07-30 VITALS — DIASTOLIC BLOOD PRESSURE: 91 MMHG | SYSTOLIC BLOOD PRESSURE: 155 MMHG

## 2020-07-30 VITALS — SYSTOLIC BLOOD PRESSURE: 170 MMHG | DIASTOLIC BLOOD PRESSURE: 90 MMHG

## 2020-07-30 VITALS — SYSTOLIC BLOOD PRESSURE: 138 MMHG | DIASTOLIC BLOOD PRESSURE: 79 MMHG

## 2020-07-30 LAB
BASOPHILS # BLD AUTO: 0 /CMM (ref 0–0.2)
BASOPHILS NFR BLD AUTO: 0.5 % (ref 0–2)
BUN SERPL-MCNC: 13 MG/DL (ref 7–18)
CALCIUM SERPL-MCNC: 7.8 MG/DL (ref 8.5–10.1)
CHLORIDE SERPL-SCNC: 106 MMOL/L (ref 98–107)
CO2 SERPL-SCNC: 24 MMOL/L (ref 21–32)
CREAT SERPL-MCNC: 1 MG/DL (ref 0.6–1.3)
EOSINOPHIL NFR BLD AUTO: 4.7 % (ref 0–6)
GLUCOSE SERPL-MCNC: 235 MG/DL (ref 74–106)
HCT VFR BLD AUTO: 36 % (ref 39–51)
HGB BLD-MCNC: 12.1 G/DL (ref 13.5–17.5)
LYMPHOCYTES NFR BLD AUTO: 2 /CMM (ref 0.8–4.8)
LYMPHOCYTES NFR BLD AUTO: 31 % (ref 20–44)
MAGNESIUM SERPL-MCNC: 1.9 MG/DL (ref 1.8–2.4)
MCHC RBC AUTO-ENTMCNC: 34 G/DL (ref 31–36)
MCV RBC AUTO: 86 FL (ref 80–96)
MONOCYTES NFR BLD AUTO: 0.4 /CMM (ref 0.1–1.3)
MONOCYTES NFR BLD AUTO: 6.8 % (ref 2–12)
NEUTROPHILS # BLD AUTO: 3.8 /CMM (ref 1.8–8.9)
NEUTROPHILS NFR BLD AUTO: 57 % (ref 43–81)
PHOSPHATE SERPL-MCNC: 3.2 MG/DL (ref 2.5–4.9)
PLATELET # BLD AUTO: 267 /CMM (ref 150–450)
POTASSIUM SERPL-SCNC: 3.6 MMOL/L (ref 3.5–5.1)
RBC # BLD AUTO: 4.19 MIL/UL (ref 4.5–6)
SODIUM SERPL-SCNC: 140 MMOL/L (ref 136–145)
WBC NRBC COR # BLD AUTO: 6.6 K/UL (ref 4.3–11)

## 2020-07-30 RX ADMIN — Medication SCH EACH: at 12:19

## 2020-07-30 RX ADMIN — INSULIN HUMAN PRN UNIT: 100 INJECTION, SOLUTION PARENTERAL at 12:19

## 2020-07-30 RX ADMIN — INSULIN HUMAN PRN UNIT: 100 INJECTION, SOLUTION PARENTERAL at 06:57

## 2020-07-30 RX ADMIN — PREGABALIN SCH MG: 100 CAPSULE ORAL at 17:47

## 2020-07-30 RX ADMIN — OXYCODONE HYDROCHLORIDE AND ACETAMINOPHEN PRN UDTAB: 5; 325 TABLET ORAL at 16:20

## 2020-07-30 RX ADMIN — DEXTROSE MONOHYDRATE SCH MLS/HR: 50 INJECTION, SOLUTION INTRAVENOUS at 10:14

## 2020-07-30 RX ADMIN — Medication PRN EACH: at 08:40

## 2020-07-30 RX ADMIN — ATORVASTATIN CALCIUM SCH MG: 40 TABLET, FILM COATED ORAL at 21:10

## 2020-07-30 RX ADMIN — METOPROLOL SUCCINATE SCH MG: 25 TABLET, EXTENDED RELEASE ORAL at 08:24

## 2020-07-30 RX ADMIN — METFORMIN HYDROCHLORIDE SCH MG: 500 TABLET, FILM COATED ORAL at 08:23

## 2020-07-30 RX ADMIN — NICOTINE SCH MG: 14 PATCH TRANSDERMAL at 08:24

## 2020-07-30 RX ADMIN — Medication SCH EACH: at 06:13

## 2020-07-30 RX ADMIN — SODIUM CHLORIDE PRN MLS/HR: 9 INJECTION, SOLUTION INTRAVENOUS at 08:26

## 2020-07-30 RX ADMIN — Medication SCH EACH: at 17:47

## 2020-07-30 RX ADMIN — PIPERACILLIN SODIUM AND TAZOBACTAM SODIUM SCH MLS/HR: .375; 3 INJECTION, POWDER, LYOPHILIZED, FOR SOLUTION INTRAVENOUS at 12:21

## 2020-07-30 RX ADMIN — PIPERACILLIN SODIUM AND TAZOBACTAM SODIUM SCH MLS/HR: .375; 3 INJECTION, POWDER, LYOPHILIZED, FOR SOLUTION INTRAVENOUS at 00:18

## 2020-07-30 RX ADMIN — PIPERACILLIN SODIUM AND TAZOBACTAM SODIUM SCH MLS/HR: .375; 3 INJECTION, POWDER, LYOPHILIZED, FOR SOLUTION INTRAVENOUS at 06:13

## 2020-07-30 RX ADMIN — PREGABALIN SCH MG: 100 CAPSULE ORAL at 08:23

## 2020-07-30 RX ADMIN — HUMAN INSULIN PRN UNIT: 100 INJECTION, SOLUTION SUBCUTANEOUS at 21:12

## 2020-07-30 RX ADMIN — DEXTROSE MONOHYDRATE SCH MLS/HR: 50 INJECTION, SOLUTION INTRAVENOUS at 21:09

## 2020-07-30 RX ADMIN — Medication SCH EACH: at 21:10

## 2020-07-30 RX ADMIN — PIPERACILLIN SODIUM AND TAZOBACTAM SODIUM SCH MLS/HR: .375; 3 INJECTION, POWDER, LYOPHILIZED, FOR SOLUTION INTRAVENOUS at 18:20

## 2020-07-30 RX ADMIN — HEPARIN SODIUM SCH UNITS: 5000 INJECTION INTRAVENOUS; SUBCUTANEOUS at 08:24

## 2020-07-30 RX ADMIN — OXYCODONE HYDROCHLORIDE AND ACETAMINOPHEN PRN UDTAB: 5; 325 TABLET ORAL at 20:39

## 2020-07-30 RX ADMIN — INSULIN HUMAN PRN UNIT: 100 INJECTION, SOLUTION PARENTERAL at 17:52

## 2020-07-30 RX ADMIN — METFORMIN HYDROCHLORIDE SCH MG: 500 TABLET, FILM COATED ORAL at 17:47

## 2020-07-30 RX ADMIN — HEPARIN SODIUM SCH UNITS: 5000 INJECTION INTRAVENOUS; SUBCUTANEOUS at 20:28

## 2020-07-30 RX ADMIN — OXYCODONE HYDROCHLORIDE AND ACETAMINOPHEN PRN UDTAB: 5; 325 TABLET ORAL at 04:12

## 2020-07-30 RX ADMIN — ASPIRIN 81 MG SCH MG: 81 TABLET ORAL at 08:23

## 2020-07-30 RX ADMIN — PREGABALIN SCH MG: 100 CAPSULE ORAL at 12:19

## 2020-07-30 NOTE — NUR
MS RN NOTES

PATIENT SEEN AND EVALUATED BY DR ZAMORA WITH NEW ORDERS MADE, NOTED AND CARRIED OUT. 
PATIENT FOR ID CONSULT, DR FUENTES NOTIFIED ID MD.

## 2020-07-30 NOTE — NUR
MS RN NOTES



AWAKE & RESPONSIVE. NOT IN ANY DISTRESS. NO SOB NOTED. DENIES ANY PAIN OR DISCOMFORT AT THIS 
TIME. WITH IVF INFUSING WELL. MONITORED ACCORDINGLY. CALL LIGHT WITHIN REACH. BED IN LOWEST 
POSITION. SR UP X 2 FOR SAFETY. WILL ENDORSE TO NEXT SHIFT.

## 2020-07-30 NOTE — NUR
MS GUZMÁN RN NOTES 

NOTIFIED PATIENT REGARDING CRUTCHES NOT COVERED BY INSURANCE, INFORMATION PROVIDED WHERE HE 
CAN GET IT FROM, PER PATIENT HE'S ABLE TO PROVIDE HIS OWN CRUTCHES UPON DISCHARGE.

## 2020-07-30 NOTE — NUR
MS RN NOTES

CALLED PHARMACY TO FOLLOW UP VANCOMYCIN IV NOT AVAILABLE ON THE FLOOR YET, PHARMACY TO BRING 
MEDICATION. WILL ADMINISTER ONCE AVAILABLE,.

## 2020-07-31 VITALS — DIASTOLIC BLOOD PRESSURE: 92 MMHG | SYSTOLIC BLOOD PRESSURE: 158 MMHG

## 2020-07-31 VITALS — SYSTOLIC BLOOD PRESSURE: 158 MMHG | DIASTOLIC BLOOD PRESSURE: 92 MMHG

## 2020-07-31 VITALS — SYSTOLIC BLOOD PRESSURE: 164 MMHG | DIASTOLIC BLOOD PRESSURE: 88 MMHG

## 2020-07-31 LAB
BASOPHILS # BLD AUTO: 0.1 /CMM (ref 0–0.2)
BASOPHILS NFR BLD AUTO: 0.8 % (ref 0–2)
BUN SERPL-MCNC: 12 MG/DL (ref 7–18)
CALCIUM SERPL-MCNC: 8.3 MG/DL (ref 8.5–10.1)
CHLORIDE SERPL-SCNC: 104 MMOL/L (ref 98–107)
CO2 SERPL-SCNC: 24 MMOL/L (ref 21–32)
CREAT SERPL-MCNC: 1.1 MG/DL (ref 0.6–1.3)
EOSINOPHIL NFR BLD AUTO: 5 % (ref 0–6)
GLUCOSE SERPL-MCNC: 231 MG/DL (ref 74–106)
HCT VFR BLD AUTO: 40 % (ref 39–51)
HGB BLD-MCNC: 13.2 G/DL (ref 13.5–17.5)
LYMPHOCYTES NFR BLD AUTO: 2.2 /CMM (ref 0.8–4.8)
LYMPHOCYTES NFR BLD AUTO: 29.3 % (ref 20–44)
MAGNESIUM SERPL-MCNC: 2 MG/DL (ref 1.8–2.4)
MCHC RBC AUTO-ENTMCNC: 33 G/DL (ref 31–36)
MCV RBC AUTO: 87 FL (ref 80–96)
MONOCYTES NFR BLD AUTO: 0.4 /CMM (ref 0.1–1.3)
MONOCYTES NFR BLD AUTO: 6 % (ref 2–12)
NEUTROPHILS # BLD AUTO: 4.4 /CMM (ref 1.8–8.9)
NEUTROPHILS NFR BLD AUTO: 58.9 % (ref 43–81)
PHOSPHATE SERPL-MCNC: 3.2 MG/DL (ref 2.5–4.9)
PLATELET # BLD AUTO: 294 /CMM (ref 150–450)
POTASSIUM SERPL-SCNC: 3.6 MMOL/L (ref 3.5–5.1)
RBC # BLD AUTO: 4.56 MIL/UL (ref 4.5–6)
SODIUM SERPL-SCNC: 137 MMOL/L (ref 136–145)
WBC NRBC COR # BLD AUTO: 7.5 K/UL (ref 4.3–11)

## 2020-07-31 RX ADMIN — METOPROLOL SUCCINATE SCH MG: 25 TABLET, EXTENDED RELEASE ORAL at 08:30

## 2020-07-31 RX ADMIN — INSULIN HUMAN PRN UNIT: 100 INJECTION, SOLUTION PARENTERAL at 06:46

## 2020-07-31 RX ADMIN — Medication SCH EACH: at 12:47

## 2020-07-31 RX ADMIN — OXYCODONE HYDROCHLORIDE AND ACETAMINOPHEN PRN UDTAB: 5; 325 TABLET ORAL at 06:33

## 2020-07-31 RX ADMIN — ASPIRIN 81 MG SCH MG: 81 TABLET ORAL at 08:29

## 2020-07-31 RX ADMIN — INSULIN HUMAN PRN UNIT: 100 INJECTION, SOLUTION PARENTERAL at 12:47

## 2020-07-31 RX ADMIN — Medication SCH EACH: at 06:47

## 2020-07-31 RX ADMIN — PIPERACILLIN SODIUM AND TAZOBACTAM SODIUM SCH MLS/HR: .375; 3 INJECTION, POWDER, LYOPHILIZED, FOR SOLUTION INTRAVENOUS at 00:17

## 2020-07-31 RX ADMIN — PREGABALIN SCH MG: 100 CAPSULE ORAL at 12:47

## 2020-07-31 RX ADMIN — HEPARIN SODIUM SCH UNITS: 5000 INJECTION INTRAVENOUS; SUBCUTANEOUS at 08:30

## 2020-07-31 RX ADMIN — PIPERACILLIN SODIUM AND TAZOBACTAM SODIUM SCH MLS/HR: .375; 3 INJECTION, POWDER, LYOPHILIZED, FOR SOLUTION INTRAVENOUS at 12:45

## 2020-07-31 RX ADMIN — METFORMIN HYDROCHLORIDE SCH MG: 500 TABLET, FILM COATED ORAL at 08:29

## 2020-07-31 RX ADMIN — NICOTINE SCH MG: 14 PATCH TRANSDERMAL at 08:29

## 2020-07-31 RX ADMIN — PIPERACILLIN SODIUM AND TAZOBACTAM SODIUM SCH MLS/HR: .375; 3 INJECTION, POWDER, LYOPHILIZED, FOR SOLUTION INTRAVENOUS at 05:40

## 2020-07-31 RX ADMIN — OXYCODONE HYDROCHLORIDE AND ACETAMINOPHEN PRN UDTAB: 5; 325 TABLET ORAL at 00:27

## 2020-07-31 RX ADMIN — PREGABALIN SCH MG: 100 CAPSULE ORAL at 08:29

## 2020-07-31 NOTE — NUR
MS RN NOTES

PATIENT IN BED ALERT ORIENTED X 4. NO ACUTE DISTRESS NOTED. BREATHING UNLABORED. IV ACCESS 
PATENT AND INTACT, NO REDNESS, NO SWELLING NOTED. DENIED PAIN AT THIS TIME. SAFETY MEASURES 
IN PLACE. CALL LIGHT WITHIN REACH. WILL CONTINUE TO MONITOR ACCORDINGLY.

## 2020-07-31 NOTE — NUR
MS RN DISCHARGE NOTES

PATIENT DISCHARGE HOME WITH STABLE VITAL SIGNS,  ALERT ORIENTED X 4. NO ACUTE DISTRESS 
NOTED. BREATHING UNLABORED.DENIED ANY PAIN, NO FACIAL GRIMACING NOTED. DISCHARGE 
INSTRUCTIONS GIVEN TO THE PATIENT INCLUDING FOLLOW UP APPOINTMENTS, FOLLOW UP WITH PRIMARY 
DOCTOR, DOCTOR SID, WOUND CLINIC , ASSISTED HOME HEALTH AND NEW PRESCRIPTIONS, VERBALIZED 
UNDERSTANDING. ALL BELONGINGS ACCOUNTED FOR. IV ACCESS REMOVED, NO REDNESS, NO SWELLING, NO 
BLEEDING NOTED. ALL BELONGINGS ACCOUNTED FOR. RIGHT FOOT DRESSING CLEAN DRY AND INTACT.  
ASSISTED TO THE LOBBY , PICKED UP VIA PRIVATE CAR IN STABLE CONDITION.

## 2020-08-12 ENCOUNTER — HOSPITAL ENCOUNTER (OUTPATIENT)
Dept: HOSPITAL 54 - WOU | Age: 45
Discharge: HOME | End: 2020-08-12
Attending: PODIATRIST
Payer: MEDICARE

## 2020-08-12 DIAGNOSIS — F17.210: ICD-10-CM

## 2020-08-12 DIAGNOSIS — E11.621: Primary | ICD-10-CM

## 2020-08-12 DIAGNOSIS — L97.412: ICD-10-CM

## 2020-08-12 DIAGNOSIS — Z79.4: ICD-10-CM

## 2020-08-12 DIAGNOSIS — E11.42: ICD-10-CM

## 2020-08-12 DIAGNOSIS — E11.610: ICD-10-CM

## 2020-08-28 ENCOUNTER — HOSPITAL ENCOUNTER (OUTPATIENT)
Dept: HOSPITAL 54 - WOU | Age: 45
Discharge: HOME | End: 2020-08-28
Attending: PODIATRIST
Payer: MEDICARE

## 2020-08-28 DIAGNOSIS — L97.412: ICD-10-CM

## 2020-08-28 DIAGNOSIS — E11.42: ICD-10-CM

## 2020-08-28 DIAGNOSIS — Z79.4: ICD-10-CM

## 2020-08-28 DIAGNOSIS — E11.610: ICD-10-CM

## 2020-08-28 DIAGNOSIS — E11.621: Primary | ICD-10-CM

## 2020-08-28 PROCEDURE — A6209 FOAM DRSG <=16 SQ IN W/O BDR: HCPCS

## 2020-08-28 PROCEDURE — 11042 DBRDMT SUBQ TIS 1ST 20SQCM/<: CPT

## 2020-09-04 ENCOUNTER — HOSPITAL ENCOUNTER (OUTPATIENT)
Dept: HOSPITAL 54 - WOU | Age: 45
Discharge: HOME | End: 2020-09-04
Attending: PODIATRIST
Payer: MEDICARE

## 2020-09-04 DIAGNOSIS — L97.412: ICD-10-CM

## 2020-09-04 DIAGNOSIS — E11.621: Primary | ICD-10-CM

## 2020-09-04 DIAGNOSIS — E11.42: ICD-10-CM

## 2020-09-04 DIAGNOSIS — Z79.4: ICD-10-CM

## 2020-09-04 DIAGNOSIS — E11.610: ICD-10-CM

## 2020-09-04 PROCEDURE — A6209 FOAM DRSG <=16 SQ IN W/O BDR: HCPCS

## 2020-09-04 PROCEDURE — 11042 DBRDMT SUBQ TIS 1ST 20SQCM/<: CPT

## 2020-09-11 ENCOUNTER — HOSPITAL ENCOUNTER (OUTPATIENT)
Dept: HOSPITAL 54 - WOU | Age: 45
Discharge: HOME | End: 2020-09-11
Attending: PODIATRIST
Payer: MEDICARE

## 2020-09-11 DIAGNOSIS — L97.412: ICD-10-CM

## 2020-09-11 DIAGNOSIS — Z74.09: ICD-10-CM

## 2020-09-11 DIAGNOSIS — E11.610: ICD-10-CM

## 2020-09-11 DIAGNOSIS — B35.1: ICD-10-CM

## 2020-09-11 DIAGNOSIS — E11.621: Primary | ICD-10-CM

## 2020-09-11 DIAGNOSIS — Z79.4: ICD-10-CM

## 2020-09-11 PROCEDURE — A6209 FOAM DRSG <=16 SQ IN W/O BDR: HCPCS

## 2020-09-11 PROCEDURE — 11042 DBRDMT SUBQ TIS 1ST 20SQCM/<: CPT

## 2021-10-13 ENCOUNTER — HOSPITAL ENCOUNTER (OUTPATIENT)
Dept: HOSPITAL 54 - WOU | Age: 46
Discharge: HOME | End: 2021-10-13
Attending: PODIATRIST
Payer: MEDICARE

## 2021-10-13 DIAGNOSIS — E11.621: Primary | ICD-10-CM

## 2021-10-13 DIAGNOSIS — L97.412: ICD-10-CM

## 2021-10-13 DIAGNOSIS — E11.610: ICD-10-CM

## 2021-10-13 DIAGNOSIS — R60.0: ICD-10-CM

## 2021-10-13 DIAGNOSIS — L03.115: ICD-10-CM

## 2021-10-13 DIAGNOSIS — F17.210: ICD-10-CM

## 2021-10-13 DIAGNOSIS — E11.42: ICD-10-CM

## 2021-10-13 PROCEDURE — 11042 DBRDMT SUBQ TIS 1ST 20SQCM/<: CPT

## 2021-10-13 PROCEDURE — A6209 FOAM DRSG <=16 SQ IN W/O BDR: HCPCS

## 2021-11-03 ENCOUNTER — HOSPITAL ENCOUNTER (OUTPATIENT)
Dept: HOSPITAL 54 - WOU | Age: 46
Discharge: HOME | End: 2021-11-03
Attending: PODIATRIST
Payer: MEDICARE

## 2021-11-03 DIAGNOSIS — Z79.4: ICD-10-CM

## 2021-11-03 DIAGNOSIS — L03.115: ICD-10-CM

## 2021-11-03 DIAGNOSIS — I87.312: ICD-10-CM

## 2021-11-03 DIAGNOSIS — L97.821: ICD-10-CM

## 2021-11-03 DIAGNOSIS — E11.610: ICD-10-CM

## 2021-11-03 DIAGNOSIS — E11.621: Primary | ICD-10-CM

## 2021-11-03 DIAGNOSIS — L97.412: ICD-10-CM

## 2021-11-03 DIAGNOSIS — E11.42: ICD-10-CM

## 2021-11-03 PROCEDURE — A6197 ALGINATE DRSG >16 <=48 SQ IN: HCPCS

## 2021-11-03 PROCEDURE — A6209 FOAM DRSG <=16 SQ IN W/O BDR: HCPCS

## 2021-11-03 PROCEDURE — 11042 DBRDMT SUBQ TIS 1ST 20SQCM/<: CPT

## 2021-11-10 ENCOUNTER — HOSPITAL ENCOUNTER (OUTPATIENT)
Dept: HOSPITAL 54 - WOU | Age: 46
Discharge: HOME | End: 2021-11-10
Attending: PODIATRIST
Payer: MEDICARE

## 2021-11-10 DIAGNOSIS — E11.610: ICD-10-CM

## 2021-11-10 DIAGNOSIS — Z79.4: ICD-10-CM

## 2021-11-10 DIAGNOSIS — E11.621: Primary | ICD-10-CM

## 2021-11-10 DIAGNOSIS — R60.0: ICD-10-CM

## 2021-11-10 DIAGNOSIS — L03.115: ICD-10-CM

## 2021-11-10 DIAGNOSIS — L97.412: ICD-10-CM

## 2021-11-10 DIAGNOSIS — I87.2: ICD-10-CM

## 2021-11-10 DIAGNOSIS — M21.41: ICD-10-CM

## 2021-11-10 DIAGNOSIS — E11.42: ICD-10-CM

## 2021-11-10 PROCEDURE — 11042 DBRDMT SUBQ TIS 1ST 20SQCM/<: CPT

## 2021-11-10 PROCEDURE — A6209 FOAM DRSG <=16 SQ IN W/O BDR: HCPCS

## 2021-11-17 ENCOUNTER — HOSPITAL ENCOUNTER (OUTPATIENT)
Dept: HOSPITAL 54 - WOU | Age: 46
Discharge: HOME | End: 2021-11-17
Attending: PODIATRIST
Payer: MEDICARE

## 2021-11-17 DIAGNOSIS — R60.0: ICD-10-CM

## 2021-11-17 DIAGNOSIS — E11.42: ICD-10-CM

## 2021-11-17 DIAGNOSIS — L97.412: ICD-10-CM

## 2021-11-17 DIAGNOSIS — E11.621: Primary | ICD-10-CM

## 2021-11-17 DIAGNOSIS — I87.2: ICD-10-CM

## 2021-11-17 DIAGNOSIS — Z79.4: ICD-10-CM

## 2021-11-17 DIAGNOSIS — F17.210: ICD-10-CM

## 2021-11-17 DIAGNOSIS — E11.610: ICD-10-CM

## 2021-11-17 PROCEDURE — 11042 DBRDMT SUBQ TIS 1ST 20SQCM/<: CPT

## 2021-11-17 PROCEDURE — A6209 FOAM DRSG <=16 SQ IN W/O BDR: HCPCS

## 2021-12-01 ENCOUNTER — HOSPITAL ENCOUNTER (OUTPATIENT)
Dept: HOSPITAL 54 - WOU | Age: 46
Discharge: HOME | End: 2021-12-01
Attending: PODIATRIST
Payer: MEDICARE

## 2021-12-01 DIAGNOSIS — Z79.84: ICD-10-CM

## 2021-12-01 DIAGNOSIS — E11.621: Primary | ICD-10-CM

## 2021-12-01 DIAGNOSIS — L03.115: ICD-10-CM

## 2021-12-01 DIAGNOSIS — E11.610: ICD-10-CM

## 2021-12-01 DIAGNOSIS — F17.210: ICD-10-CM

## 2021-12-01 DIAGNOSIS — E11.42: ICD-10-CM

## 2021-12-01 DIAGNOSIS — Z79.4: ICD-10-CM

## 2021-12-01 DIAGNOSIS — M21.41: ICD-10-CM

## 2021-12-01 DIAGNOSIS — L97.412: ICD-10-CM

## 2021-12-01 PROCEDURE — 11042 DBRDMT SUBQ TIS 1ST 20SQCM/<: CPT

## 2021-12-01 PROCEDURE — A6209 FOAM DRSG <=16 SQ IN W/O BDR: HCPCS

## 2021-12-03 ENCOUNTER — HOSPITAL ENCOUNTER (EMERGENCY)
Dept: HOSPITAL 54 - ER | Age: 46
Discharge: HOME | End: 2021-12-03
Payer: MEDICARE

## 2021-12-03 VITALS — WEIGHT: 280 LBS | HEIGHT: 73 IN | BODY MASS INDEX: 37.11 KG/M2

## 2021-12-03 VITALS — DIASTOLIC BLOOD PRESSURE: 84 MMHG | SYSTOLIC BLOOD PRESSURE: 138 MMHG

## 2021-12-03 DIAGNOSIS — R07.89: Primary | ICD-10-CM

## 2021-12-03 DIAGNOSIS — I10: ICD-10-CM

## 2021-12-03 DIAGNOSIS — G89.29: ICD-10-CM

## 2021-12-03 DIAGNOSIS — Z79.82: ICD-10-CM

## 2021-12-03 DIAGNOSIS — E11.40: ICD-10-CM

## 2021-12-03 DIAGNOSIS — Z98.890: ICD-10-CM

## 2021-12-03 DIAGNOSIS — Z79.899: ICD-10-CM

## 2021-12-03 DIAGNOSIS — F17.200: ICD-10-CM

## 2021-12-03 LAB
BASOPHILS # BLD AUTO: 0 K/UL (ref 0–0.2)
BASOPHILS NFR BLD AUTO: 0.5 % (ref 0–2)
BUN SERPL-MCNC: 26 MG/DL (ref 7–18)
CALCIUM SERPL-MCNC: 8 MG/DL (ref 8.5–10.1)
CHLORIDE SERPL-SCNC: 106 MMOL/L (ref 98–107)
CO2 SERPL-SCNC: 24 MMOL/L (ref 21–32)
CREAT SERPL-MCNC: 1.8 MG/DL (ref 0.6–1.3)
EOSINOPHIL NFR BLD AUTO: 3.9 % (ref 0–6)
GLUCOSE SERPL-MCNC: 234 MG/DL (ref 74–106)
HCT VFR BLD AUTO: 29 % (ref 39–51)
HGB BLD-MCNC: 9.9 G/DL (ref 13.5–17.5)
LYMPHOCYTES NFR BLD AUTO: 2.1 K/UL (ref 0.8–4.8)
LYMPHOCYTES NFR BLD AUTO: 23.1 % (ref 20–44)
MCHC RBC AUTO-ENTMCNC: 34 G/DL (ref 31–36)
MCV RBC AUTO: 87 FL (ref 80–96)
MONOCYTES NFR BLD AUTO: 0.5 K/UL (ref 0.1–1.3)
MONOCYTES NFR BLD AUTO: 5.9 % (ref 2–12)
NEUTROPHILS # BLD AUTO: 6.1 K/UL (ref 1.8–8.9)
NEUTROPHILS NFR BLD AUTO: 66.6 % (ref 43–81)
PLATELET # BLD AUTO: 310 K/UL (ref 150–450)
POTASSIUM SERPL-SCNC: 4.1 MMOL/L (ref 3.5–5.1)
RBC # BLD AUTO: 3.36 MIL/UL (ref 4.5–6)
SODIUM SERPL-SCNC: 138 MMOL/L (ref 136–145)
WBC NRBC COR # BLD AUTO: 9.2 K/UL (ref 4.3–11)

## 2021-12-03 NOTE — NUR
BIBS C/O CHEST PAIN R/T LEFT SHOULDER X 2 DAYS. PT STATED THAT CURRENT PAIN 
LEVEL IS 7/10 ON PS. PATIENT IS A&OX4. VITALS ARE WITHIN PT BASELINE. BREATHING 
IS REGULAR AND UNLABORED. PT WAS ATTACHED TO CARDIAC MONITOR AND PULSE OX. WILL 
CONTINUE TO MONITOR.

## 2023-01-08 ENCOUNTER — HOSPITAL ENCOUNTER (INPATIENT)
Dept: HOSPITAL 54 - ER | Age: 48
LOS: 4 days | Discharge: HOME | DRG: 640 | End: 2023-01-12
Attending: INTERNAL MEDICINE | Admitting: INTERNAL MEDICINE
Payer: MEDICARE

## 2023-01-08 VITALS — HEIGHT: 72 IN | WEIGHT: 300 LBS | BODY MASS INDEX: 40.63 KG/M2

## 2023-01-08 DIAGNOSIS — Z98.890: ICD-10-CM

## 2023-01-08 DIAGNOSIS — G89.29: ICD-10-CM

## 2023-01-08 DIAGNOSIS — N18.9: ICD-10-CM

## 2023-01-08 DIAGNOSIS — E87.5: ICD-10-CM

## 2023-01-08 DIAGNOSIS — E78.5: ICD-10-CM

## 2023-01-08 DIAGNOSIS — E87.70: Primary | ICD-10-CM

## 2023-01-08 DIAGNOSIS — E11.51: ICD-10-CM

## 2023-01-08 DIAGNOSIS — I12.9: ICD-10-CM

## 2023-01-08 DIAGNOSIS — Z83.3: ICD-10-CM

## 2023-01-08 DIAGNOSIS — Z79.84: ICD-10-CM

## 2023-01-08 DIAGNOSIS — Z98.1: ICD-10-CM

## 2023-01-08 DIAGNOSIS — Z20.822: ICD-10-CM

## 2023-01-08 DIAGNOSIS — N17.0: ICD-10-CM

## 2023-01-08 DIAGNOSIS — E66.01: ICD-10-CM

## 2023-01-08 DIAGNOSIS — E11.40: ICD-10-CM

## 2023-01-08 DIAGNOSIS — F17.200: ICD-10-CM

## 2023-01-08 DIAGNOSIS — Z79.899: ICD-10-CM

## 2023-01-08 DIAGNOSIS — Z79.82: ICD-10-CM

## 2023-01-08 DIAGNOSIS — E11.22: ICD-10-CM

## 2023-01-08 DIAGNOSIS — D63.8: ICD-10-CM

## 2023-01-08 DIAGNOSIS — G47.33: ICD-10-CM

## 2023-01-08 LAB
ALBUMIN SERPL BCP-MCNC: 2.3 G/DL (ref 3.4–5)
ALP SERPL-CCNC: 164 U/L (ref 46–116)
ALT SERPL W P-5'-P-CCNC: 22 U/L (ref 12–78)
AST SERPL W P-5'-P-CCNC: 19 U/L (ref 15–37)
BASOPHILS # BLD AUTO: 0 K/UL (ref 0–0.2)
BASOPHILS NFR BLD AUTO: 0.2 % (ref 0–2)
BILIRUB DIRECT SERPL-MCNC: 0.1 MG/DL (ref 0–0.2)
BILIRUB SERPL-MCNC: 0.2 MG/DL (ref 0.2–1)
BILIRUB UR QL STRIP: NEGATIVE
BUN SERPL-MCNC: 36 MG/DL (ref 7–18)
CALCIUM SERPL-MCNC: 8.4 MG/DL (ref 8.5–10.1)
CHLORIDE SERPL-SCNC: 107 MMOL/L (ref 98–107)
CO2 SERPL-SCNC: 19 MMOL/L (ref 21–32)
COLOR UR: YELLOW
CREAT SERPL-MCNC: 3.2 MG/DL (ref 0.6–1.3)
DEPRECATED SQUAMOUS URNS QL MICRO: (no result) /HPF
EOSINOPHIL NFR BLD AUTO: 3.4 % (ref 0–6)
GLUCOSE SERPL-MCNC: 207 MG/DL (ref 74–106)
GLUCOSE UR STRIP-MCNC: (no result) MG/DL
HCT VFR BLD AUTO: 27 % (ref 39–51)
HGB BLD-MCNC: 8.7 G/DL (ref 13.5–17.5)
LEUKOCYTE ESTERASE UR QL STRIP: NEGATIVE
LIPASE SERPL-CCNC: 56 U/L (ref 73–393)
LYMPHOCYTES NFR BLD AUTO: 0.9 K/UL (ref 0.8–4.8)
LYMPHOCYTES NFR BLD AUTO: 9.6 % (ref 20–44)
MCHC RBC AUTO-ENTMCNC: 32 G/DL (ref 31–36)
MCV RBC AUTO: 84 FL (ref 80–96)
MONOCYTES NFR BLD AUTO: 0.5 K/UL (ref 0.1–1.3)
MONOCYTES NFR BLD AUTO: 5.3 % (ref 2–12)
NEUTROPHILS # BLD AUTO: 8 K/UL (ref 1.8–8.9)
NEUTROPHILS NFR BLD AUTO: 81.5 % (ref 43–81)
NITRITE UR QL STRIP: NEGATIVE
PH UR STRIP: 6 [PH] (ref 5–8)
PLATELET # BLD AUTO: 414 K/UL (ref 150–450)
POTASSIUM SERPL-SCNC: 5.5 MMOL/L (ref 3.5–5.1)
PROT SERPL-MCNC: 6.6 G/DL (ref 6.4–8.2)
PROT UR QL STRIP: (no result) MG/DL
RBC # BLD AUTO: 3.27 MIL/UL (ref 4.5–6)
RBC #/AREA URNS HPF: (no result) /HPF (ref 0–2)
SODIUM SERPL-SCNC: 134 MMOL/L (ref 136–145)
UROBILINOGEN UR STRIP-MCNC: 0.2 EU/DL
WBC #/AREA URNS HPF: (no result) /HPF (ref 0–3)
WBC NRBC COR # BLD AUTO: 9.8 K/UL (ref 4.3–11)

## 2023-01-08 PROCEDURE — G0378 HOSPITAL OBSERVATION PER HR: HCPCS

## 2023-01-08 PROCEDURE — C9803 HOPD COVID-19 SPEC COLLECT: HCPCS

## 2023-01-08 RX ADMIN — Medication SCH EACH: at 18:00

## 2023-01-08 RX ADMIN — ATORVASTATIN CALCIUM SCH MG: 40 TABLET, FILM COATED ORAL at 22:00

## 2023-01-08 RX ADMIN — METOPROLOL SUCCINATE SCH MG: 25 TABLET, EXTENDED RELEASE ORAL at 00:00

## 2023-01-08 RX ADMIN — METOPROLOL SUCCINATE SCH MG: 25 TABLET, EXTENDED RELEASE ORAL at 21:00

## 2023-01-09 VITALS — DIASTOLIC BLOOD PRESSURE: 85 MMHG | SYSTOLIC BLOOD PRESSURE: 160 MMHG

## 2023-01-09 VITALS — SYSTOLIC BLOOD PRESSURE: 132 MMHG | DIASTOLIC BLOOD PRESSURE: 77 MMHG

## 2023-01-09 VITALS — DIASTOLIC BLOOD PRESSURE: 80 MMHG | SYSTOLIC BLOOD PRESSURE: 169 MMHG

## 2023-01-09 LAB
BASOPHILS # BLD AUTO: 0 K/UL (ref 0–0.2)
BASOPHILS NFR BLD AUTO: 0.5 % (ref 0–2)
BUN SERPL-MCNC: 39 MG/DL (ref 7–18)
CALCIUM SERPL-MCNC: 8.1 MG/DL (ref 8.5–10.1)
CHLORIDE SERPL-SCNC: 110 MMOL/L (ref 98–107)
CO2 SERPL-SCNC: 25 MMOL/L (ref 21–32)
CREAT SERPL-MCNC: 3.4 MG/DL (ref 0.6–1.3)
EOSINOPHIL NFR BLD AUTO: 4.9 % (ref 0–6)
GLUCOSE SERPL-MCNC: 134 MG/DL (ref 74–106)
HCT VFR BLD AUTO: 26 % (ref 39–51)
HGB BLD-MCNC: 8.1 G/DL (ref 13.5–17.5)
LYMPHOCYTES NFR BLD AUTO: 1.2 K/UL (ref 0.8–4.8)
LYMPHOCYTES NFR BLD AUTO: 17.9 % (ref 20–44)
MAGNESIUM SERPL-MCNC: 2.4 MG/DL (ref 1.8–2.4)
MCHC RBC AUTO-ENTMCNC: 32 G/DL (ref 31–36)
MCV RBC AUTO: 84 FL (ref 80–96)
MONOCYTES NFR BLD AUTO: 0.6 K/UL (ref 0.1–1.3)
MONOCYTES NFR BLD AUTO: 8.2 % (ref 2–12)
NEUTROPHILS # BLD AUTO: 4.7 K/UL (ref 1.8–8.9)
NEUTROPHILS NFR BLD AUTO: 68.5 % (ref 43–81)
PHOSPHATE SERPL-MCNC: 5.2 MG/DL (ref 2.5–4.9)
PLATELET # BLD AUTO: 375 K/UL (ref 150–450)
POTASSIUM SERPL-SCNC: 5.4 MMOL/L (ref 3.5–5.1)
RBC # BLD AUTO: 3.05 MIL/UL (ref 4.5–6)
SODIUM SERPL-SCNC: 140 MMOL/L (ref 136–145)
WBC NRBC COR # BLD AUTO: 6.9 K/UL (ref 4.3–11)

## 2023-01-09 RX ADMIN — NITROGLYCERIN SCH GM: 20 OINTMENT TOPICAL at 09:59

## 2023-01-09 RX ADMIN — PREGABALIN SCH MG: 25 CAPSULE ORAL at 12:48

## 2023-01-09 RX ADMIN — ASPIRIN 81 MG SCH MG: 81 TABLET ORAL at 08:11

## 2023-01-09 RX ADMIN — Medication SCH EACH: at 17:32

## 2023-01-09 RX ADMIN — INSULIN HUMAN PRN UNITS: 100 INJECTION, SOLUTION PARENTERAL at 12:16

## 2023-01-09 RX ADMIN — CYCLOBENZAPRINE HYDROCHLORIDE SCH MG: 10 TABLET, FILM COATED ORAL at 16:07

## 2023-01-09 RX ADMIN — METOPROLOL SUCCINATE SCH MG: 25 TABLET, EXTENDED RELEASE ORAL at 21:12

## 2023-01-09 RX ADMIN — OXYCODONE HYDROCHLORIDE AND ACETAMINOPHEN PRN UDTAB: 5; 325 TABLET ORAL at 16:12

## 2023-01-09 RX ADMIN — INSULIN HUMAN PRN UNITS: 100 INJECTION, SOLUTION PARENTERAL at 07:00

## 2023-01-09 RX ADMIN — Medication SCH EACH: at 00:00

## 2023-01-09 RX ADMIN — OXYCODONE HYDROCHLORIDE AND ACETAMINOPHEN PRN UDTAB: 5; 325 TABLET ORAL at 08:02

## 2023-01-09 RX ADMIN — CYCLOBENZAPRINE HYDROCHLORIDE SCH MG: 10 TABLET, FILM COATED ORAL at 12:48

## 2023-01-09 RX ADMIN — INSULIN HUMAN PRN UNITS: 100 INJECTION, SOLUTION PARENTERAL at 17:34

## 2023-01-09 RX ADMIN — Medication SCH EACH: at 12:13

## 2023-01-09 RX ADMIN — ATORVASTATIN CALCIUM SCH MG: 40 TABLET, FILM COATED ORAL at 21:13

## 2023-01-09 RX ADMIN — METOPROLOL SUCCINATE SCH MG: 25 TABLET, EXTENDED RELEASE ORAL at 09:36

## 2023-01-09 RX ADMIN — CYCLOBENZAPRINE HYDROCHLORIDE SCH MG: 10 TABLET, FILM COATED ORAL at 08:12

## 2023-01-09 RX ADMIN — METOPROLOL SUCCINATE SCH MG: 25 TABLET, EXTENDED RELEASE ORAL at 09:00

## 2023-01-09 RX ADMIN — PREGABALIN SCH MG: 25 CAPSULE ORAL at 08:12

## 2023-01-09 RX ADMIN — NITROGLYCERIN SCH GM: 20 OINTMENT TOPICAL at 21:12

## 2023-01-09 RX ADMIN — Medication SCH EACH: at 06:15

## 2023-01-09 RX ADMIN — PREGABALIN SCH MG: 25 CAPSULE ORAL at 16:07

## 2023-01-09 RX ADMIN — METOPROLOL SUCCINATE SCH MG: 25 TABLET, EXTENDED RELEASE ORAL at 00:00

## 2023-01-10 VITALS — SYSTOLIC BLOOD PRESSURE: 130 MMHG | DIASTOLIC BLOOD PRESSURE: 75 MMHG

## 2023-01-10 VITALS — DIASTOLIC BLOOD PRESSURE: 74 MMHG | SYSTOLIC BLOOD PRESSURE: 137 MMHG

## 2023-01-10 VITALS — DIASTOLIC BLOOD PRESSURE: 78 MMHG | SYSTOLIC BLOOD PRESSURE: 146 MMHG

## 2023-01-10 VITALS — SYSTOLIC BLOOD PRESSURE: 162 MMHG | DIASTOLIC BLOOD PRESSURE: 100 MMHG

## 2023-01-10 VITALS — DIASTOLIC BLOOD PRESSURE: 81 MMHG | SYSTOLIC BLOOD PRESSURE: 135 MMHG

## 2023-01-10 VITALS — SYSTOLIC BLOOD PRESSURE: 132 MMHG | DIASTOLIC BLOOD PRESSURE: 80 MMHG

## 2023-01-10 LAB
ALBUMIN SERPL BCP-MCNC: 2.1 G/DL (ref 3.4–5)
ALP SERPL-CCNC: 143 U/L (ref 46–116)
ALT SERPL W P-5'-P-CCNC: 20 U/L (ref 12–78)
AST SERPL W P-5'-P-CCNC: 19 U/L (ref 15–37)
BASOPHILS # BLD AUTO: 0 K/UL (ref 0–0.2)
BASOPHILS NFR BLD AUTO: 0.4 % (ref 0–2)
BILIRUB SERPL-MCNC: 0.2 MG/DL (ref 0.2–1)
BUN SERPL-MCNC: 45 MG/DL (ref 7–18)
CALCIUM SERPL-MCNC: 8.2 MG/DL (ref 8.5–10.1)
CHLORIDE SERPL-SCNC: 108 MMOL/L (ref 98–107)
CO2 SERPL-SCNC: 21 MMOL/L (ref 21–32)
CREAT SERPL-MCNC: 3.4 MG/DL (ref 0.6–1.3)
EOSINOPHIL NFR BLD AUTO: 5 % (ref 0–6)
GLUCOSE SERPL-MCNC: 148 MG/DL (ref 74–106)
HCT VFR BLD AUTO: 24 % (ref 39–51)
HGB BLD-MCNC: 8 G/DL (ref 13.5–17.5)
LYMPHOCYTES NFR BLD AUTO: 1.3 K/UL (ref 0.8–4.8)
LYMPHOCYTES NFR BLD AUTO: 17.5 % (ref 20–44)
MAGNESIUM SERPL-MCNC: 2.4 MG/DL (ref 1.8–2.4)
MCHC RBC AUTO-ENTMCNC: 33 G/DL (ref 31–36)
MCV RBC AUTO: 83 FL (ref 80–96)
MONOCYTES NFR BLD AUTO: 0.6 K/UL (ref 0.1–1.3)
MONOCYTES NFR BLD AUTO: 8.7 % (ref 2–12)
NEUTROPHILS # BLD AUTO: 4.9 K/UL (ref 1.8–8.9)
NEUTROPHILS NFR BLD AUTO: 68.4 % (ref 43–81)
PHOSPHATE SERPL-MCNC: 5.6 MG/DL (ref 2.5–4.9)
PLATELET # BLD AUTO: 369 K/UL (ref 150–450)
POTASSIUM SERPL-SCNC: 4.8 MMOL/L (ref 3.5–5.1)
PROT SERPL-MCNC: 6.1 G/DL (ref 6.4–8.2)
RBC # BLD AUTO: 2.94 MIL/UL (ref 4.5–6)
SODIUM SERPL-SCNC: 138 MMOL/L (ref 136–145)
WBC NRBC COR # BLD AUTO: 7.2 K/UL (ref 4.3–11)

## 2023-01-10 RX ADMIN — Medication SCH EACH: at 17:12

## 2023-01-10 RX ADMIN — Medication SCH EACH: at 11:13

## 2023-01-10 RX ADMIN — PREGABALIN SCH MG: 25 CAPSULE ORAL at 08:28

## 2023-01-10 RX ADMIN — INSULIN HUMAN PRN UNITS: 100 INJECTION, SOLUTION PARENTERAL at 05:40

## 2023-01-10 RX ADMIN — METOPROLOL SUCCINATE SCH MG: 25 TABLET, EXTENDED RELEASE ORAL at 21:35

## 2023-01-10 RX ADMIN — PREGABALIN SCH MG: 25 CAPSULE ORAL at 16:07

## 2023-01-10 RX ADMIN — Medication SCH EACH: at 00:52

## 2023-01-10 RX ADMIN — CYCLOBENZAPRINE HYDROCHLORIDE SCH MG: 10 TABLET, FILM COATED ORAL at 16:07

## 2023-01-10 RX ADMIN — OXYCODONE HYDROCHLORIDE AND ACETAMINOPHEN PRN UDTAB: 5; 325 TABLET ORAL at 00:59

## 2023-01-10 RX ADMIN — OXYCODONE HYDROCHLORIDE AND ACETAMINOPHEN PRN UDTAB: 5; 325 TABLET ORAL at 22:08

## 2023-01-10 RX ADMIN — INSULIN HUMAN PRN UNITS: 100 INJECTION, SOLUTION PARENTERAL at 11:15

## 2023-01-10 RX ADMIN — ASPIRIN 81 MG SCH MG: 81 TABLET ORAL at 08:29

## 2023-01-10 RX ADMIN — INSULIN HUMAN PRN UNITS: 100 INJECTION, SOLUTION PARENTERAL at 00:54

## 2023-01-10 RX ADMIN — Medication SCH EACH: at 05:39

## 2023-01-10 RX ADMIN — ATORVASTATIN CALCIUM SCH MG: 40 TABLET, FILM COATED ORAL at 21:34

## 2023-01-10 RX ADMIN — PREGABALIN SCH MG: 25 CAPSULE ORAL at 12:07

## 2023-01-10 RX ADMIN — NITROGLYCERIN SCH GM: 20 OINTMENT TOPICAL at 08:30

## 2023-01-10 RX ADMIN — HUMAN INSULIN PRN UNIT: 100 INJECTION, SOLUTION SUBCUTANEOUS at 22:13

## 2023-01-10 RX ADMIN — METOPROLOL SUCCINATE SCH MG: 25 TABLET, EXTENDED RELEASE ORAL at 08:29

## 2023-01-10 RX ADMIN — Medication SCH EACH: at 21:36

## 2023-01-10 RX ADMIN — NITROGLYCERIN SCH GM: 20 OINTMENT TOPICAL at 21:36

## 2023-01-10 RX ADMIN — CYCLOBENZAPRINE HYDROCHLORIDE SCH MG: 10 TABLET, FILM COATED ORAL at 08:28

## 2023-01-10 RX ADMIN — OXYCODONE HYDROCHLORIDE AND ACETAMINOPHEN PRN UDTAB: 5; 325 TABLET ORAL at 16:07

## 2023-01-10 RX ADMIN — CYCLOBENZAPRINE HYDROCHLORIDE SCH MG: 10 TABLET, FILM COATED ORAL at 12:08

## 2023-01-10 RX ADMIN — OXYCODONE HYDROCHLORIDE AND ACETAMINOPHEN PRN UDTAB: 5; 325 TABLET ORAL at 08:28

## 2023-01-11 VITALS — DIASTOLIC BLOOD PRESSURE: 84 MMHG | SYSTOLIC BLOOD PRESSURE: 162 MMHG

## 2023-01-11 VITALS — SYSTOLIC BLOOD PRESSURE: 164 MMHG | DIASTOLIC BLOOD PRESSURE: 95 MMHG

## 2023-01-11 VITALS — DIASTOLIC BLOOD PRESSURE: 99 MMHG | SYSTOLIC BLOOD PRESSURE: 152 MMHG

## 2023-01-11 VITALS — SYSTOLIC BLOOD PRESSURE: 139 MMHG | DIASTOLIC BLOOD PRESSURE: 95 MMHG

## 2023-01-11 VITALS — SYSTOLIC BLOOD PRESSURE: 143 MMHG | DIASTOLIC BLOOD PRESSURE: 87 MMHG

## 2023-01-11 LAB
BASOPHILS # BLD AUTO: 0 K/UL (ref 0–0.2)
BASOPHILS NFR BLD AUTO: 0.3 % (ref 0–2)
BUN SERPL-MCNC: 47 MG/DL (ref 7–18)
CALCIUM SERPL-MCNC: 8.3 MG/DL (ref 8.5–10.1)
CHLORIDE SERPL-SCNC: 107 MMOL/L (ref 98–107)
CO2 SERPL-SCNC: 21 MMOL/L (ref 21–32)
CREAT SERPL-MCNC: 3.1 MG/DL (ref 0.6–1.3)
EOSINOPHIL NFR BLD AUTO: 5.1 % (ref 0–6)
GLUCOSE SERPL-MCNC: 158 MG/DL (ref 74–106)
HCT VFR BLD AUTO: 27 % (ref 39–51)
HGB BLD-MCNC: 8.5 G/DL (ref 13.5–17.5)
LYMPHOCYTES NFR BLD AUTO: 1.3 K/UL (ref 0.8–4.8)
LYMPHOCYTES NFR BLD AUTO: 18.3 % (ref 20–44)
MCHC RBC AUTO-ENTMCNC: 32 G/DL (ref 31–36)
MCV RBC AUTO: 83 FL (ref 80–96)
MONOCYTES NFR BLD AUTO: 0.8 K/UL (ref 0.1–1.3)
MONOCYTES NFR BLD AUTO: 10.7 % (ref 2–12)
NEUTROPHILS # BLD AUTO: 4.8 K/UL (ref 1.8–8.9)
NEUTROPHILS NFR BLD AUTO: 65.6 % (ref 43–81)
PLATELET # BLD AUTO: 360 K/UL (ref 150–450)
POTASSIUM SERPL-SCNC: 5.2 MMOL/L (ref 3.5–5.1)
RBC # BLD AUTO: 3.2 MIL/UL (ref 4.5–6)
SODIUM SERPL-SCNC: 137 MMOL/L (ref 136–145)
WBC NRBC COR # BLD AUTO: 7.3 K/UL (ref 4.3–11)

## 2023-01-11 RX ADMIN — INSULIN HUMAN PRN UNITS: 100 INJECTION, SOLUTION PARENTERAL at 12:31

## 2023-01-11 RX ADMIN — Medication SCH EACH: at 06:48

## 2023-01-11 RX ADMIN — ATORVASTATIN CALCIUM SCH MG: 40 TABLET, FILM COATED ORAL at 21:09

## 2023-01-11 RX ADMIN — NITROGLYCERIN SCH GM: 20 OINTMENT TOPICAL at 20:41

## 2023-01-11 RX ADMIN — INSULIN HUMAN PRN UNITS: 100 INJECTION, SOLUTION PARENTERAL at 17:34

## 2023-01-11 RX ADMIN — PREGABALIN SCH MG: 25 CAPSULE ORAL at 08:24

## 2023-01-11 RX ADMIN — Medication SCH EACH: at 21:13

## 2023-01-11 RX ADMIN — METOPROLOL SUCCINATE SCH MG: 25 TABLET, EXTENDED RELEASE ORAL at 08:25

## 2023-01-11 RX ADMIN — Medication SCH EACH: at 17:35

## 2023-01-11 RX ADMIN — OXYCODONE HYDROCHLORIDE AND ACETAMINOPHEN PRN UDTAB: 5; 325 TABLET ORAL at 17:42

## 2023-01-11 RX ADMIN — CYCLOBENZAPRINE HYDROCHLORIDE SCH MG: 10 TABLET, FILM COATED ORAL at 08:24

## 2023-01-11 RX ADMIN — OXYCODONE HYDROCHLORIDE AND ACETAMINOPHEN PRN UDTAB: 5; 325 TABLET ORAL at 08:35

## 2023-01-11 RX ADMIN — ASPIRIN 81 MG SCH MG: 81 TABLET ORAL at 08:25

## 2023-01-11 RX ADMIN — PREGABALIN SCH MG: 25 CAPSULE ORAL at 17:33

## 2023-01-11 RX ADMIN — Medication SCH EACH: at 12:30

## 2023-01-11 RX ADMIN — HUMAN INSULIN PRN UNIT: 100 INJECTION, SOLUTION SUBCUTANEOUS at 21:14

## 2023-01-11 RX ADMIN — CYCLOBENZAPRINE HYDROCHLORIDE SCH MG: 10 TABLET, FILM COATED ORAL at 12:32

## 2023-01-11 RX ADMIN — CYCLOBENZAPRINE HYDROCHLORIDE SCH MG: 10 TABLET, FILM COATED ORAL at 17:32

## 2023-01-11 RX ADMIN — NITROGLYCERIN SCH GM: 20 OINTMENT TOPICAL at 08:26

## 2023-01-11 RX ADMIN — PREGABALIN SCH MG: 25 CAPSULE ORAL at 12:32

## 2023-01-11 RX ADMIN — INSULIN HUMAN PRN UNITS: 100 INJECTION, SOLUTION PARENTERAL at 06:11

## 2023-01-11 RX ADMIN — METOPROLOL SUCCINATE SCH MG: 25 TABLET, EXTENDED RELEASE ORAL at 20:42

## 2023-01-12 VITALS — DIASTOLIC BLOOD PRESSURE: 70 MMHG | SYSTOLIC BLOOD PRESSURE: 105 MMHG

## 2023-01-12 VITALS — SYSTOLIC BLOOD PRESSURE: 105 MMHG | DIASTOLIC BLOOD PRESSURE: 70 MMHG

## 2023-01-12 VITALS — DIASTOLIC BLOOD PRESSURE: 100 MMHG | SYSTOLIC BLOOD PRESSURE: 157 MMHG

## 2023-01-12 VITALS — SYSTOLIC BLOOD PRESSURE: 141 MMHG | DIASTOLIC BLOOD PRESSURE: 80 MMHG

## 2023-01-12 LAB
BASOPHILS # BLD AUTO: 0 K/UL (ref 0–0.2)
BASOPHILS NFR BLD AUTO: 0.3 % (ref 0–2)
BUN SERPL-MCNC: 43 MG/DL (ref 7–18)
CALCIUM SERPL-MCNC: 8.3 MG/DL (ref 8.5–10.1)
CHLORIDE SERPL-SCNC: 108 MMOL/L (ref 98–107)
CO2 SERPL-SCNC: 24 MMOL/L (ref 21–32)
CREAT SERPL-MCNC: 3 MG/DL (ref 0.6–1.3)
EOSINOPHIL NFR BLD AUTO: 3.7 % (ref 0–6)
GLUCOSE SERPL-MCNC: 145 MG/DL (ref 74–106)
HCT VFR BLD AUTO: 25 % (ref 39–51)
HGB BLD-MCNC: 8.1 G/DL (ref 13.5–17.5)
LYMPHOCYTES NFR BLD AUTO: 1.1 K/UL (ref 0.8–4.8)
LYMPHOCYTES NFR BLD AUTO: 14.2 % (ref 20–44)
MCHC RBC AUTO-ENTMCNC: 32 G/DL (ref 31–36)
MCV RBC AUTO: 83 FL (ref 80–96)
MONOCYTES NFR BLD AUTO: 0.7 K/UL (ref 0.1–1.3)
MONOCYTES NFR BLD AUTO: 9.5 % (ref 2–12)
NEUTROPHILS # BLD AUTO: 5.5 K/UL (ref 1.8–8.9)
NEUTROPHILS NFR BLD AUTO: 72.3 % (ref 43–81)
PLATELET # BLD AUTO: 357 K/UL (ref 150–450)
POTASSIUM SERPL-SCNC: 4.9 MMOL/L (ref 3.5–5.1)
RBC # BLD AUTO: 3.04 MIL/UL (ref 4.5–6)
SODIUM SERPL-SCNC: 139 MMOL/L (ref 136–145)
WBC NRBC COR # BLD AUTO: 7.6 K/UL (ref 4.3–11)

## 2023-01-12 RX ADMIN — NITROGLYCERIN SCH GM: 20 OINTMENT TOPICAL at 08:46

## 2023-01-12 RX ADMIN — FUROSEMIDE SCH MG: 10 INJECTION INTRAVENOUS at 08:41

## 2023-01-12 RX ADMIN — HUMAN INSULIN PRN UNIT: 100 INJECTION, SOLUTION SUBCUTANEOUS at 06:33

## 2023-01-12 RX ADMIN — ASPIRIN 81 MG SCH MG: 81 TABLET ORAL at 08:43

## 2023-01-12 RX ADMIN — FUROSEMIDE SCH MG: 10 INJECTION INTRAVENOUS at 09:00

## 2023-01-12 RX ADMIN — CYCLOBENZAPRINE HYDROCHLORIDE SCH MG: 10 TABLET, FILM COATED ORAL at 08:43

## 2023-01-12 RX ADMIN — Medication SCH EACH: at 06:32

## 2023-01-12 RX ADMIN — METOPROLOL SUCCINATE SCH MG: 25 TABLET, EXTENDED RELEASE ORAL at 08:42

## 2023-01-12 RX ADMIN — FUROSEMIDE SCH MG: 10 INJECTION INTRAVENOUS at 06:30

## 2023-01-12 RX ADMIN — PREGABALIN SCH MG: 25 CAPSULE ORAL at 08:44

## 2023-01-12 RX ADMIN — OXYCODONE HYDROCHLORIDE AND ACETAMINOPHEN PRN UDTAB: 5; 325 TABLET ORAL at 06:38

## 2023-01-22 ENCOUNTER — HOSPITAL ENCOUNTER (INPATIENT)
Dept: HOSPITAL 54 - ER | Age: 48
LOS: 6 days | Discharge: LEFT BEFORE DISCHARGE | DRG: 177 | End: 2023-01-28
Attending: NURSE PRACTITIONER | Admitting: NURSE PRACTITIONER
Payer: MEDICARE

## 2023-01-22 VITALS — DIASTOLIC BLOOD PRESSURE: 86 MMHG | SYSTOLIC BLOOD PRESSURE: 143 MMHG

## 2023-01-22 VITALS — SYSTOLIC BLOOD PRESSURE: 138 MMHG | DIASTOLIC BLOOD PRESSURE: 78 MMHG

## 2023-01-22 VITALS — WEIGHT: 302 LBS | HEIGHT: 74 IN | BODY MASS INDEX: 38.76 KG/M2

## 2023-01-22 DIAGNOSIS — K74.60: ICD-10-CM

## 2023-01-22 DIAGNOSIS — Z79.899: ICD-10-CM

## 2023-01-22 DIAGNOSIS — Z79.01: ICD-10-CM

## 2023-01-22 DIAGNOSIS — I16.0: ICD-10-CM

## 2023-01-22 DIAGNOSIS — E78.5: ICD-10-CM

## 2023-01-22 DIAGNOSIS — Z83.3: ICD-10-CM

## 2023-01-22 DIAGNOSIS — Z79.84: ICD-10-CM

## 2023-01-22 DIAGNOSIS — Z74.09: ICD-10-CM

## 2023-01-22 DIAGNOSIS — D75.839: ICD-10-CM

## 2023-01-22 DIAGNOSIS — I13.0: ICD-10-CM

## 2023-01-22 DIAGNOSIS — D68.59: ICD-10-CM

## 2023-01-22 DIAGNOSIS — G89.29: ICD-10-CM

## 2023-01-22 DIAGNOSIS — E83.39: ICD-10-CM

## 2023-01-22 DIAGNOSIS — N17.0: ICD-10-CM

## 2023-01-22 DIAGNOSIS — J98.11: ICD-10-CM

## 2023-01-22 DIAGNOSIS — G92.8: ICD-10-CM

## 2023-01-22 DIAGNOSIS — E11.22: ICD-10-CM

## 2023-01-22 DIAGNOSIS — E66.01: ICD-10-CM

## 2023-01-22 DIAGNOSIS — J15.9: ICD-10-CM

## 2023-01-22 DIAGNOSIS — F23: ICD-10-CM

## 2023-01-22 DIAGNOSIS — F12.90: ICD-10-CM

## 2023-01-22 DIAGNOSIS — E88.09: ICD-10-CM

## 2023-01-22 DIAGNOSIS — Z79.4: ICD-10-CM

## 2023-01-22 DIAGNOSIS — J96.21: ICD-10-CM

## 2023-01-22 DIAGNOSIS — E87.5: ICD-10-CM

## 2023-01-22 DIAGNOSIS — Z79.82: ICD-10-CM

## 2023-01-22 DIAGNOSIS — E44.0: ICD-10-CM

## 2023-01-22 DIAGNOSIS — I34.9: ICD-10-CM

## 2023-01-22 DIAGNOSIS — Z98.890: ICD-10-CM

## 2023-01-22 DIAGNOSIS — Z91.199: ICD-10-CM

## 2023-01-22 DIAGNOSIS — R60.9: ICD-10-CM

## 2023-01-22 DIAGNOSIS — I50.33: ICD-10-CM

## 2023-01-22 DIAGNOSIS — Z87.891: ICD-10-CM

## 2023-01-22 DIAGNOSIS — U07.1: Primary | ICD-10-CM

## 2023-01-22 DIAGNOSIS — J12.82: ICD-10-CM

## 2023-01-22 DIAGNOSIS — N18.9: ICD-10-CM

## 2023-01-22 DIAGNOSIS — G47.33: ICD-10-CM

## 2023-01-22 LAB
ALBUMIN SERPL BCP-MCNC: 1.9 G/DL (ref 3.4–5)
ALP SERPL-CCNC: 176 U/L (ref 46–116)
ALT SERPL W P-5'-P-CCNC: 25 U/L (ref 12–78)
AST SERPL W P-5'-P-CCNC: 38 U/L (ref 15–37)
BASOPHILS # BLD AUTO: 0 K/UL (ref 0–0.2)
BASOPHILS NFR BLD AUTO: 0.2 % (ref 0–2)
BILIRUB DIRECT SERPL-MCNC: 0.1 MG/DL (ref 0–0.2)
BILIRUB SERPL-MCNC: 0.2 MG/DL (ref 0.2–1)
BILIRUB UR QL STRIP: NEGATIVE
BUN SERPL-MCNC: 62 MG/DL (ref 7–18)
CALCIUM SERPL-MCNC: 8.1 MG/DL (ref 8.5–10.1)
CHLORIDE SERPL-SCNC: 105 MMOL/L (ref 98–107)
CO2 SERPL-SCNC: 24 MMOL/L (ref 21–32)
COLOR UR: YELLOW
CREAT SERPL-MCNC: 4.5 MG/DL (ref 0.6–1.3)
EOSINOPHIL NFR BLD AUTO: 0.8 % (ref 0–6)
GLUCOSE SERPL-MCNC: 44 MG/DL (ref 74–106)
GLUCOSE UR STRIP-MCNC: NEGATIVE MG/DL
HCT VFR BLD AUTO: 27 % (ref 39–51)
HGB BLD-MCNC: 8.5 G/DL (ref 13.5–17.5)
LEUKOCYTE ESTERASE UR QL STRIP: NEGATIVE
LYMPHOCYTES NFR BLD AUTO: 0.9 K/UL (ref 0.8–4.8)
LYMPHOCYTES NFR BLD AUTO: 8.3 % (ref 20–44)
MCHC RBC AUTO-ENTMCNC: 32 G/DL (ref 31–36)
MCV RBC AUTO: 82 FL (ref 80–96)
MONOCYTES NFR BLD AUTO: 0.8 K/UL (ref 0.1–1.3)
MONOCYTES NFR BLD AUTO: 7.2 % (ref 2–12)
NEUTROPHILS # BLD AUTO: 9.3 K/UL (ref 1.8–8.9)
NEUTROPHILS NFR BLD AUTO: 83.5 % (ref 43–81)
NITRITE UR QL STRIP: NEGATIVE
PH UR STRIP: 6 [PH] (ref 5–8)
PLATELET # BLD AUTO: 487 K/UL (ref 150–450)
POTASSIUM SERPL-SCNC: 4.9 MMOL/L (ref 3.5–5.1)
PROT SERPL-MCNC: 6.6 G/DL (ref 6.4–8.2)
PROT UR QL STRIP: (no result) MG/DL
RBC # BLD AUTO: 3.3 MIL/UL (ref 4.5–6)
RBC #/AREA URNS HPF: (no result) /HPF (ref 0–2)
SODIUM SERPL-SCNC: 138 MMOL/L (ref 136–145)
UROBILINOGEN UR STRIP-MCNC: 0.2 EU/DL
WBC #/AREA URNS HPF: (no result) /HPF (ref 0–3)
WBC NRBC COR # BLD AUTO: 11.2 K/UL (ref 4.3–11)

## 2023-01-22 PROCEDURE — C9113 INJ PANTOPRAZOLE SODIUM, VIA: HCPCS

## 2023-01-22 PROCEDURE — G0378 HOSPITAL OBSERVATION PER HR: HCPCS

## 2023-01-22 PROCEDURE — C9803 HOPD COVID-19 SPEC COLLECT: HCPCS

## 2023-01-22 RX ADMIN — HYDROCODONE BITARTRATE AND ACETAMINOPHEN PRN TAB: 10; 325 TABLET ORAL at 18:07

## 2023-01-22 RX ADMIN — ATORVASTATIN CALCIUM SCH MG: 40 TABLET, FILM COATED ORAL at 21:51

## 2023-01-22 RX ADMIN — HUMAN INSULIN PRN UNIT: 100 INJECTION, SOLUTION SUBCUTANEOUS at 23:23

## 2023-01-22 RX ADMIN — DEXAMETHASONE SODIUM PHOSPHATE SCH MG: 10 INJECTION INTRAMUSCULAR; INTRAVENOUS at 17:46

## 2023-01-22 RX ADMIN — HEPARIN SODIUM PRN MLS/HR: 5000 INJECTION, SOLUTION INTRAVENOUS at 20:51

## 2023-01-22 RX ADMIN — DEXTROSE MONOHYDRATE SCH MLS/HR: 50 INJECTION, SOLUTION INTRAVENOUS at 16:58

## 2023-01-22 RX ADMIN — Medication SCH EACH: at 23:23

## 2023-01-22 RX ADMIN — CYCLOBENZAPRINE HYDROCHLORIDE SCH MG: 10 TABLET, FILM COATED ORAL at 21:51

## 2023-01-22 RX ADMIN — HYDRALAZINE HYDROCHLORIDE PRN MG: 20 INJECTION INTRAMUSCULAR; INTRAVENOUS at 21:52

## 2023-01-22 RX ADMIN — CYCLOBENZAPRINE HYDROCHLORIDE SCH MG: 10 TABLET, FILM COATED ORAL at 16:58

## 2023-01-22 NOTE — NUR
RN CLOSING NOTE

PATIENT IS IN ROOM, SITTING IN A CHAIR, STATING THAT IS EASIER TO BREATH WHEN SITED UPRIGHT. 
PT STARTED ON HEPARIN, IV PUSH THEN DRIP 1800 UNITS AT 19:00, WILL BE TITRATED IN 6 HOURS AT 
1AM. SAFETY MEASURES IMPLEMENTED, CALL LIGHT WITHIN REACH, WILL ENDORSE TO THE NIGHT NURSE.

## 2023-01-22 NOTE — NUR
TELE RN NOTE



PT BP= 182/98, PT CURRENTLY ON HEPARIN DRIP, NO DUE ANTI-HTN MEDS AT THIS TIME, JAYLON BRIAN NOTIFIED WITH NEW ORDER OF HYDRAZALINE 10MG Q4 AS NEEDED FOR SBP>160.

## 2023-01-22 NOTE — NUR
TELE RN OPENING NOTE



RECEIVED PT. UP IN CHAIR, ALERT/AWAKEX3. NO C/O PAIN OR DISCOMFORT AT THIS TIME. PT ON 4LPM 
VIA NC, BREATHING LABORED, O2 SAT 96%. PT CURRENTLY RECEIVING HEPARIN DRIP 1800U/HR 
(36ML/HR) STARTED BY MORNING NURSE, RN UNABLE TO SCAN HEPARIN ON EMAR. NO S/SX OF ACTIVE 
BLEEDING NOTED. IV ACCESS ON LAC #22, PATENT AND FLUSHES WELL. PT INSISTS ON STAYING UP ON 
CHAIR. FALL PRECAUTIONS OBSERVED AT ALL TIMES. ISO PRECATIONS IMPLEMENTED. CALL LIGHT WITHIN 
EASY REACH. WILL CONT TO OBSERVE PT CLOSELY.

## 2023-01-22 NOTE — NUR
PATIENT ADMITTED TO THE UNIT. SEVERE SOB, ON O2 VIA NC 3-4 L, VITALS STABLE, NO FEVER. COVID 
POSITIVE, CONTACT AND DROPLET PRECAUTIONS IMPLEMENTED, IV ACCESS LEFT AC FLUSHES WELL. 
COMFORT PT IN ROOM, SAFETY MEASURES IMPLEMENTED, CALL LIGHT WITHIN REACH.

## 2023-01-23 VITALS — SYSTOLIC BLOOD PRESSURE: 164 MMHG | DIASTOLIC BLOOD PRESSURE: 90 MMHG

## 2023-01-23 VITALS — DIASTOLIC BLOOD PRESSURE: 88 MMHG | SYSTOLIC BLOOD PRESSURE: 143 MMHG

## 2023-01-23 VITALS — DIASTOLIC BLOOD PRESSURE: 82 MMHG | SYSTOLIC BLOOD PRESSURE: 154 MMHG

## 2023-01-23 VITALS — SYSTOLIC BLOOD PRESSURE: 144 MMHG | DIASTOLIC BLOOD PRESSURE: 95 MMHG

## 2023-01-23 VITALS — DIASTOLIC BLOOD PRESSURE: 92 MMHG | SYSTOLIC BLOOD PRESSURE: 157 MMHG

## 2023-01-23 VITALS — DIASTOLIC BLOOD PRESSURE: 64 MMHG | SYSTOLIC BLOOD PRESSURE: 132 MMHG

## 2023-01-23 LAB
ALBUMIN SERPL BCP-MCNC: 1.9 G/DL (ref 3.4–5)
ALP SERPL-CCNC: 176 U/L (ref 46–116)
ALT SERPL W P-5'-P-CCNC: 33 U/L (ref 12–78)
AST SERPL W P-5'-P-CCNC: 50 U/L (ref 15–37)
BASE EXCESS BLDA CALC-SCNC: -6.9 MMOL/L
BASOPHILS # BLD AUTO: 0 K/UL (ref 0–0.2)
BASOPHILS NFR BLD AUTO: 0.1 % (ref 0–2)
BILIRUB SERPL-MCNC: 0.2 MG/DL (ref 0.2–1)
BUN SERPL-MCNC: 64 MG/DL (ref 7–18)
CALCIUM SERPL-MCNC: 8.5 MG/DL (ref 8.5–10.1)
CHLORIDE SERPL-SCNC: 101 MMOL/L (ref 98–107)
CO2 SERPL-SCNC: 21 MMOL/L (ref 21–32)
CREAT SERPL-MCNC: 4.5 MG/DL (ref 0.6–1.3)
EOSINOPHIL NFR BLD AUTO: 0.1 % (ref 0–6)
GLUCOSE SERPL-MCNC: 105 MG/DL (ref 74–106)
HCT VFR BLD AUTO: 27 % (ref 39–51)
HGB BLD-MCNC: 8.7 G/DL (ref 13.5–17.5)
INHALED O2 CONCENTRATION: 36 %
LYMPHOCYTES NFR BLD AUTO: 1 K/UL (ref 0.8–4.8)
LYMPHOCYTES NFR BLD AUTO: 9.1 % (ref 20–44)
MAGNESIUM SERPL-MCNC: 2.2 MG/DL (ref 1.8–2.4)
MCHC RBC AUTO-ENTMCNC: 32 G/DL (ref 31–36)
MCV RBC AUTO: 81 FL (ref 80–96)
MONOCYTES NFR BLD AUTO: 0.7 K/UL (ref 0.1–1.3)
MONOCYTES NFR BLD AUTO: 5.9 % (ref 2–12)
NEUTROPHILS # BLD AUTO: 9.6 K/UL (ref 1.8–8.9)
NEUTROPHILS NFR BLD AUTO: 84.8 % (ref 43–81)
PCO2 TEMP ADJ BLDA: 39 MMHG (ref 35–45)
PH TEMP ADJ BLDA: 7.3 [PH] (ref 7.35–7.45)
PHOSPHATE SERPL-MCNC: 6.9 MG/DL (ref 2.5–4.9)
PLATELET # BLD AUTO: 546 K/UL (ref 150–450)
PO2 TEMP ADJ BLDA: 114 MMHG (ref 75–100)
POTASSIUM SERPL-SCNC: 5.3 MMOL/L (ref 3.5–5.1)
PROT SERPL-MCNC: 6.8 G/DL (ref 6.4–8.2)
RBC # BLD AUTO: 3.29 MIL/UL (ref 4.5–6)
SODIUM SERPL-SCNC: 136 MMOL/L (ref 136–145)
VENTILATION MODE VENT: (no result)
WBC NRBC COR # BLD AUTO: 11.3 K/UL (ref 4.3–11)

## 2023-01-23 RX ADMIN — METOPROLOL SUCCINATE SCH MG: 50 TABLET, EXTENDED RELEASE ORAL at 08:35

## 2023-01-23 RX ADMIN — SODIUM CHLORIDE SCH MG: 9 INJECTION, SOLUTION INTRAVENOUS at 08:34

## 2023-01-23 RX ADMIN — PREGABALIN SCH MG: 100 CAPSULE ORAL at 16:30

## 2023-01-23 RX ADMIN — ASPIRIN 81 MG SCH MG: 81 TABLET ORAL at 08:35

## 2023-01-23 RX ADMIN — HEPARIN SODIUM PRN MLS/HR: 5000 INJECTION, SOLUTION INTRAVENOUS at 10:50

## 2023-01-23 RX ADMIN — DEXTROSE MONOHYDRATE SCH MLS/HR: 50 INJECTION, SOLUTION INTRAVENOUS at 16:30

## 2023-01-23 RX ADMIN — CYCLOBENZAPRINE HYDROCHLORIDE SCH MG: 10 TABLET, FILM COATED ORAL at 12:40

## 2023-01-23 RX ADMIN — INSULIN HUMAN PRN UNITS: 100 INJECTION, SOLUTION PARENTERAL at 17:35

## 2023-01-23 RX ADMIN — APIXABAN SCH MG: 5 TABLET, FILM COATED ORAL at 16:31

## 2023-01-23 RX ADMIN — Medication SCH EACH: at 07:52

## 2023-01-23 RX ADMIN — Medication SCH EACH: at 12:39

## 2023-01-23 RX ADMIN — HUMAN INSULIN PRN UNIT: 100 INJECTION, SOLUTION SUBCUTANEOUS at 22:11

## 2023-01-23 RX ADMIN — PREGABALIN SCH MG: 100 CAPSULE ORAL at 12:39

## 2023-01-23 RX ADMIN — DEXAMETHASONE SODIUM PHOSPHATE SCH MG: 10 INJECTION INTRAMUSCULAR; INTRAVENOUS at 08:34

## 2023-01-23 RX ADMIN — INSULIN HUMAN PRN UNITS: 100 INJECTION, SOLUTION PARENTERAL at 12:46

## 2023-01-23 RX ADMIN — CYCLOBENZAPRINE HYDROCHLORIDE SCH MG: 10 TABLET, FILM COATED ORAL at 08:34

## 2023-01-23 RX ADMIN — Medication SCH EACH: at 17:33

## 2023-01-23 RX ADMIN — HYDRALAZINE HYDROCHLORIDE PRN MG: 20 INJECTION INTRAMUSCULAR; INTRAVENOUS at 02:50

## 2023-01-23 RX ADMIN — APIXABAN SCH MG: 5 TABLET, FILM COATED ORAL at 14:29

## 2023-01-23 RX ADMIN — CYCLOBENZAPRINE HYDROCHLORIDE SCH MG: 10 TABLET, FILM COATED ORAL at 21:44

## 2023-01-23 RX ADMIN — AZITHROMYCIN DIHYDRATE SCH MG: 250 TABLET, FILM COATED ORAL at 12:46

## 2023-01-23 RX ADMIN — CYCLOBENZAPRINE HYDROCHLORIDE SCH MG: 10 TABLET, FILM COATED ORAL at 16:31

## 2023-01-23 RX ADMIN — ATORVASTATIN CALCIUM SCH MG: 40 TABLET, FILM COATED ORAL at 21:44

## 2023-01-23 RX ADMIN — PREGABALIN SCH MG: 100 CAPSULE ORAL at 09:51

## 2023-01-23 RX ADMIN — Medication SCH EACH: at 22:09

## 2023-01-23 NOTE — NUR
RN notes:

received a call from the lab ptt is 84.0 heparin drip was discontinued at 1310, notified 
Pily Palafox with no new order.

## 2023-01-23 NOTE — NUR
TELE RN NOTE



PTT RESULT 52.4, MD JAYLON MCNAMARA NOTIFIED WITH ORDER TO CONT CURRENT DOSE OF HEPARIN 
DRIP 1800U/HR (36ML/HR). NO ACTIVE BLEEDING NOTED AT THIS TIME.

## 2023-01-23 NOTE — NUR
RN CLOSING NOTE

PATIENT in bed asleep, on O2 4 liter via nasal cannula,noted with sob with mild exertion, 
also he is confused .NP Pily aware MEASURES IMPLEMENTED, CALL LIGHT WITHIN REACH, WILL 
ENDORSE TO THE NIGHT NURSE.

## 2023-01-23 NOTE — NUR
RN NOTES:



BLOOD SUAGR 175. 3 UNITS OF REGULAR INSULIN GIVEN. NO S/S OF HYPER/HYPOGLYCEMIA. WILL 
CONTINUE TO MONITOR

## 2023-01-23 NOTE — NUR
TELE RN CLOSING NOTE



PT AWAKE, ALERT AND ORIENTED. VERBALLY RESPONSIVE. PT REMAINS ON 4LPM VIA NC, BREATHING 
LABORED, O2 SAT 96%. GENERALIZED BODY TWITCHING NOTED. REMAINS ON HEPARIN DRIP AT 1800U/HR, 
NO ASE NOTED, LAC IV ACCESS PATENT, FLUSHES WELL. PT AMB WITH ASSIST. ALL NEEDS ATTENDED. 
SAFETY PRECAUTIONS OBSERVED AT ALL TIMES ISOLATION PRECAUTIONS OBSERVED AT ALL TIMES. WILL 
ENDORSE TO AM SHIFT.

## 2023-01-23 NOTE — NUR
TELE RN OPENING NOTE



RECEIVED PT. UP IN CHAIR, ALERT/AWAKEX3. NO C/O PAIN OR DISCOMFORT AT THIS TIME. PT ON 4LPM 
VIA NC, BREATHING LABORED, O2 SAT 96%. PT CURRENTLY RECEIVING HEPARIN DRIP 1800U/HR 
(36ML/HR). NO S/SX OF ACTIVE BLEEDING NOTED. IV ACCESS ON LAC #22, PATENT AND FLUSHES WELL. 
PT INSISTS ON STAYING UP ON CHAIR. FALL PRECAUTIONS OBSERVED AT ALL TIMES. ISO PRECATIONS 
IMPLEMENTED. CALL LIGHT WITHIN EASY REACH. WILL CONT TO OBSERVE PT CLOSELY.

## 2023-01-24 VITALS — DIASTOLIC BLOOD PRESSURE: 77 MMHG | SYSTOLIC BLOOD PRESSURE: 145 MMHG

## 2023-01-24 VITALS — SYSTOLIC BLOOD PRESSURE: 172 MMHG | DIASTOLIC BLOOD PRESSURE: 72 MMHG

## 2023-01-24 VITALS — DIASTOLIC BLOOD PRESSURE: 64 MMHG | SYSTOLIC BLOOD PRESSURE: 100 MMHG

## 2023-01-24 VITALS — SYSTOLIC BLOOD PRESSURE: 176 MMHG | DIASTOLIC BLOOD PRESSURE: 87 MMHG

## 2023-01-24 VITALS — DIASTOLIC BLOOD PRESSURE: 74 MMHG | SYSTOLIC BLOOD PRESSURE: 128 MMHG

## 2023-01-24 VITALS — SYSTOLIC BLOOD PRESSURE: 156 MMHG | DIASTOLIC BLOOD PRESSURE: 72 MMHG

## 2023-01-24 VITALS — DIASTOLIC BLOOD PRESSURE: 97 MMHG | SYSTOLIC BLOOD PRESSURE: 139 MMHG

## 2023-01-24 VITALS — DIASTOLIC BLOOD PRESSURE: 89 MMHG | SYSTOLIC BLOOD PRESSURE: 161 MMHG

## 2023-01-24 VITALS — SYSTOLIC BLOOD PRESSURE: 156 MMHG | DIASTOLIC BLOOD PRESSURE: 85 MMHG

## 2023-01-24 VITALS — SYSTOLIC BLOOD PRESSURE: 152 MMHG | DIASTOLIC BLOOD PRESSURE: 41 MMHG

## 2023-01-24 VITALS — SYSTOLIC BLOOD PRESSURE: 169 MMHG | DIASTOLIC BLOOD PRESSURE: 97 MMHG

## 2023-01-24 VITALS — SYSTOLIC BLOOD PRESSURE: 161 MMHG | DIASTOLIC BLOOD PRESSURE: 89 MMHG

## 2023-01-24 VITALS — SYSTOLIC BLOOD PRESSURE: 135 MMHG | DIASTOLIC BLOOD PRESSURE: 74 MMHG

## 2023-01-24 VITALS — SYSTOLIC BLOOD PRESSURE: 158 MMHG | DIASTOLIC BLOOD PRESSURE: 80 MMHG

## 2023-01-24 VITALS — SYSTOLIC BLOOD PRESSURE: 178 MMHG | DIASTOLIC BLOOD PRESSURE: 82 MMHG

## 2023-01-24 LAB
ALBUMIN SERPL BCP-MCNC: 1.8 G/DL (ref 3.4–5)
ALP SERPL-CCNC: 161 U/L (ref 46–116)
ALT SERPL W P-5'-P-CCNC: 30 U/L (ref 12–78)
AST SERPL W P-5'-P-CCNC: 37 U/L (ref 15–37)
BASE EXCESS BLDA CALC-SCNC: -10 MMOL/L
BASE EXCESS BLDA CALC-SCNC: -8.4 MMOL/L
BASOPHILS # BLD AUTO: 0 K/UL (ref 0–0.2)
BASOPHILS NFR BLD AUTO: 0.2 % (ref 0–2)
BILIRUB SERPL-MCNC: 0.2 MG/DL (ref 0.2–1)
BUN SERPL-MCNC: 73 MG/DL (ref 7–18)
CALCIUM SERPL-MCNC: 8.3 MG/DL (ref 8.5–10.1)
CHLORIDE SERPL-SCNC: 102 MMOL/L (ref 98–107)
CO2 SERPL-SCNC: 21 MMOL/L (ref 21–32)
CREAT SERPL-MCNC: 5 MG/DL (ref 0.6–1.3)
DO-HGB MFR BLDA: 66 MMHG
DO-HGB MFR BLDA: 90.2 MMHG
EOSINOPHIL NFR BLD AUTO: 0.7 % (ref 0–6)
GLUCOSE SERPL-MCNC: 144 MG/DL (ref 74–106)
HCT VFR BLD AUTO: 26 % (ref 39–51)
HGB BLD-MCNC: 8.4 G/DL (ref 13.5–17.5)
INHALED O2 CONCENTRATION: 30 %
INHALED O2 CONCENTRATION: 36 %
LYMPHOCYTES NFR BLD AUTO: 1.2 K/UL (ref 0.8–4.8)
LYMPHOCYTES NFR BLD AUTO: 11 % (ref 20–44)
MAGNESIUM SERPL-MCNC: 2.4 MG/DL (ref 1.8–2.4)
MCHC RBC AUTO-ENTMCNC: 32 G/DL (ref 31–36)
MCV RBC AUTO: 82 FL (ref 80–96)
MONOCYTES NFR BLD AUTO: 1 K/UL (ref 0.1–1.3)
MONOCYTES NFR BLD AUTO: 8.8 % (ref 2–12)
NEUTROPHILS # BLD AUTO: 8.9 K/UL (ref 1.8–8.9)
NEUTROPHILS NFR BLD AUTO: 79.3 % (ref 43–81)
PCO2 TEMP ADJ BLDA: 43.4 MMHG (ref 35–45)
PCO2 TEMP ADJ BLDA: 54.6 MMHG (ref 35–45)
PH TEMP ADJ BLDA: 7.18 [PH] (ref 7.35–7.45)
PH TEMP ADJ BLDA: 7.22 [PH] (ref 7.35–7.45)
PHOSPHATE SERPL-MCNC: 8.5 MG/DL (ref 2.5–4.9)
PLATELET # BLD AUTO: 524 K/UL (ref 150–450)
PO2 TEMP ADJ BLDA: 103.1 MMHG (ref 75–100)
PO2 TEMP ADJ BLDA: 96.9 MMHG (ref 75–100)
POTASSIUM SERPL-SCNC: 5.4 MMOL/L (ref 3.5–5.1)
PROT SERPL-MCNC: 6.6 G/DL (ref 6.4–8.2)
RBC # BLD AUTO: 3.17 MIL/UL (ref 4.5–6)
SAO2 % BLDA: 96.4 % (ref 92–98.5)
SAO2 % BLDA: 96.6 % (ref 92–98.5)
SODIUM SERPL-SCNC: 135 MMOL/L (ref 136–145)
VENTILATION MODE VENT: (no result)
VENTILATION MODE VENT: (no result)
WBC NRBC COR # BLD AUTO: 11.3 K/UL (ref 4.3–11)

## 2023-01-24 PROCEDURE — 5A09457 ASSISTANCE WITH RESPIRATORY VENTILATION, 24-96 CONSECUTIVE HOURS, CONTINUOUS POSITIVE AIRWAY PRESSURE: ICD-10-PCS | Performed by: INTERNAL MEDICINE

## 2023-01-24 RX ADMIN — CYCLOBENZAPRINE HYDROCHLORIDE SCH MG: 10 TABLET, FILM COATED ORAL at 09:00

## 2023-01-24 RX ADMIN — DEXTROSE MONOHYDRATE SCH MLS/HR: 50 INJECTION, SOLUTION INTRAVENOUS at 15:48

## 2023-01-24 RX ADMIN — APIXABAN SCH MG: 5 TABLET, FILM COATED ORAL at 16:50

## 2023-01-24 RX ADMIN — Medication SCH EACH: at 07:39

## 2023-01-24 RX ADMIN — Medication SCH EACH: at 16:52

## 2023-01-24 RX ADMIN — PREGABALIN SCH MG: 100 CAPSULE ORAL at 12:35

## 2023-01-24 RX ADMIN — Medication SCH EACH: at 23:36

## 2023-01-24 RX ADMIN — CYCLOBENZAPRINE HYDROCHLORIDE SCH MG: 10 TABLET, FILM COATED ORAL at 20:54

## 2023-01-24 RX ADMIN — METOPROLOL SUCCINATE SCH MG: 50 TABLET, EXTENDED RELEASE ORAL at 08:20

## 2023-01-24 RX ADMIN — CYCLOBENZAPRINE HYDROCHLORIDE SCH MG: 10 TABLET, FILM COATED ORAL at 12:35

## 2023-01-24 RX ADMIN — Medication SCH EACH: at 12:40

## 2023-01-24 RX ADMIN — ATORVASTATIN CALCIUM SCH MG: 40 TABLET, FILM COATED ORAL at 21:19

## 2023-01-24 RX ADMIN — PREGABALIN SCH MG: 100 CAPSULE ORAL at 16:51

## 2023-01-24 RX ADMIN — PREGABALIN SCH MG: 100 CAPSULE ORAL at 09:00

## 2023-01-24 RX ADMIN — CYCLOBENZAPRINE HYDROCHLORIDE SCH MG: 10 TABLET, FILM COATED ORAL at 16:51

## 2023-01-24 RX ADMIN — AZITHROMYCIN DIHYDRATE SCH MG: 250 TABLET, FILM COATED ORAL at 09:00

## 2023-01-24 RX ADMIN — DEXAMETHASONE SODIUM PHOSPHATE SCH MG: 10 INJECTION INTRAMUSCULAR; INTRAVENOUS at 08:11

## 2023-01-24 RX ADMIN — ASPIRIN 81 MG SCH MG: 81 TABLET ORAL at 09:00

## 2023-01-24 RX ADMIN — SODIUM CHLORIDE SCH MG: 9 INJECTION, SOLUTION INTRAVENOUS at 08:11

## 2023-01-24 RX ADMIN — HUMAN INSULIN PRN UNIT: 100 INJECTION, SOLUTION SUBCUTANEOUS at 12:40

## 2023-01-24 NOTE — NUR
ICU/RN:



PT BECAME EXTREMELY AGGRESSIVE REMOVED ALL MONITORING EQUIPMENT. THREW HIS BIPAP ACROSS THE 
ROOM AND WAS TRYING TO TEAR THE MONITOR OFF THE WALL WITH THE CABLES AND DAMAGE HOSPITAL 
PROPERTY. PT HAD TO BE BEHAVIORALLY RESTRAINED. FACE TO FACE WILL BE PERFORMED BY MIGUE SHEPPARD. WILL CONTINUE WITH PLAN OF CARE. SITTER OBSERVING PT Q15 MIN CHECKS.

## 2023-01-24 NOTE — NUR
ICU/RN:



CHARGE RN GIANFRANCO TRIED PLACING IV LINE ON PT. PT BECAME VERBALLY AGGRESSIVE TOWARDS HER. SHE 
STOPPED PROCEDURE. PT EDUCATED ON NEED FOR IV PLACEMENT PT DISENGAGED AND UNINTERESTED IN PT 
EDUCATION.

## 2023-01-24 NOTE — NUR
OPENING NOTE

PATIENT RECEIVED IN BED RESTING COMFORTABLY, NO SIGNS OF IN DISTRESS, SPO2-98% VIA N/C ON 
4L/MIN. VITAL SIGNS ARE STABLE, BED IN LOW POSITION, CALL LIGHT WITHIN REACH, 1:1 MONITORING 
D/T AGITATION, WILL F/U WITH NEPHROLOGIST REGARDING K-5.4, CREATININE-5.0, MAYBE NEED 
DIALYSIS.

## 2023-01-24 NOTE — NUR
DR SALINAS ORDERED PATIENT WILL GO TO ICU WITH HIGH FLOW BIPAP, NURSING SUPERVISOR AND CHARGE 
NURSE INFORMED. @0945, PATIENT ARRIVED IN , REPORT GIVEN TO CHERELLE GROSS,

## 2023-01-24 NOTE — NUR
RN CLOSING NOTE



PT IS SITTING UP IN BED AND CONTINUES TO TAKE OFF BIPAP; REDIRECTIONS UNSUCCESSFUL AND WILL 
WEAR THE BIPAP AND THEN TAKE IT OFF. PT IS NPO AT THIS TIME AND IS CONFUSED AND PHYSICALLY 
AGGRESSIVE AND CONTINUES TO BREAK BI LATERAL SOFT WRIST RESTRAINTS. PT PULLED OUT L AC IV 
ACCESS; MIDLINE HAS BEEN ORDERED. BED IS LOCKED IN LOWEST POSITION X2 BED RAILS UP AND ALL 
HOSPITAL SAFETY MEASURES ARE IN PLACE WILL ENDORSE TO NIGHT SHIFT FOR PRATEEK.

## 2023-01-24 NOTE — NUR
RN NOTES:



PT IS SLEEPING AT THIS MOMENT. RECEIVED ORDER FOR 1 :1 SITTER. ORDER NOTED AND CARRIED OUT.

## 2023-01-24 NOTE — NUR
RN NOTE



PT IS CURRENTLY ON BI LATERAL SOFT WRIST RESTRAINTS AND CONTINUES TO FREE HIMSELF AND NOT 
FOLLOW COMMANDS.

## 2023-01-24 NOTE — NUR
0250 Patient's daughter at bedside convincing patient to put oxygen back on and to go back 
to bed, but patient keeps refusing.  Cont. to reiterate to patient the risks of his refusal 
to put oxygen on including death but to no avail. Patient being closely monitored.

## 2023-01-24 NOTE — NUR
RN NOTES:



PT BECAME VERY AGITATED/AGGRESSIVE, GOT OUT FROM ROOM 103, PULLED OUT THE CALL LIGHT FROM 
THE WALL AND WENT TO ROOM 104. TRIED TO BREAK THE WINDOW WITH IV POLE. WHEN NURSING STAFFS 
TRIED TO STOP HIM, ALSO TRIED TO HIT THE NURSING STAFF MEMBERS AND SECURITY GUARDS WITH THE 
POLE. CODE GREY ACTIVATED. NURSING SUPERVISOR CAME TO UNIT. STILL NURSING STAFFS TRIED TO 
LET HIM SIT ON THE BED, TRIED TO APPLY OXYGEN BUT HE CONSTANTLY REFUSED AND BECAME VERY 
AGGRESSIVE. NOTIFIED YARA MELÉNDEZ. ORDER ATIVAN 2MG/1ML IM ONCE. ORDER NOTED AND 
CARRIED OUT.

## 2023-01-24 NOTE — NUR
ICU/RN:



PT NONCOMPLIANT WITH TELE MONITORING, SPO2 MONITORING AND BIPAP. PT EDUCATED ON POLICY AND 
PROCEDURE. PT UNINTERESTED IN TEACHING AND NOT AGREEABLE. NO IV SITE AT THIS TIME AS PT 
KEEPS PULLING THEM OUT. SITTER MONITORING THE PT FOR PT SAFETY. WILL CONTINUE WITH PLAN OF 
CARE.

## 2023-01-24 NOTE — NUR
0235 Called patient's daughter Amie and notified her of patient's behavior, she said she 
will call her father on his cell phone.

## 2023-01-24 NOTE — NUR
0300 Able to assist patient back to bed and oxygen was put back on.  O2 saturation noted at 
93-94%.  No respiratory distress noted.  Daughter remains at bedside in PPE for safety.

## 2023-01-24 NOTE — NUR
RT



Pt continuously removing BIPAP mask throughout shift. Pt combative and not cooperating. Pt 
placed on NC pending HFNC standby order, but pt also removing cannula. RN aware. During 
BIPAP removal Sp02 between 82%-89%. 

Able to place pt back on BIPAP mask at this time. Will continue to monitor.

## 2023-01-25 VITALS — DIASTOLIC BLOOD PRESSURE: 77 MMHG | SYSTOLIC BLOOD PRESSURE: 175 MMHG

## 2023-01-25 VITALS — DIASTOLIC BLOOD PRESSURE: 93 MMHG | SYSTOLIC BLOOD PRESSURE: 172 MMHG

## 2023-01-25 VITALS — SYSTOLIC BLOOD PRESSURE: 172 MMHG | DIASTOLIC BLOOD PRESSURE: 93 MMHG

## 2023-01-25 VITALS — DIASTOLIC BLOOD PRESSURE: 106 MMHG | SYSTOLIC BLOOD PRESSURE: 182 MMHG

## 2023-01-25 VITALS — DIASTOLIC BLOOD PRESSURE: 118 MMHG | SYSTOLIC BLOOD PRESSURE: 169 MMHG

## 2023-01-25 VITALS — SYSTOLIC BLOOD PRESSURE: 167 MMHG | DIASTOLIC BLOOD PRESSURE: 84 MMHG

## 2023-01-25 VITALS — SYSTOLIC BLOOD PRESSURE: 171 MMHG | DIASTOLIC BLOOD PRESSURE: 81 MMHG

## 2023-01-25 VITALS — DIASTOLIC BLOOD PRESSURE: 176 MMHG | SYSTOLIC BLOOD PRESSURE: 219 MMHG

## 2023-01-25 VITALS — SYSTOLIC BLOOD PRESSURE: 184 MMHG | DIASTOLIC BLOOD PRESSURE: 72 MMHG

## 2023-01-25 VITALS — SYSTOLIC BLOOD PRESSURE: 173 MMHG | DIASTOLIC BLOOD PRESSURE: 98 MMHG

## 2023-01-25 VITALS — DIASTOLIC BLOOD PRESSURE: 95 MMHG | SYSTOLIC BLOOD PRESSURE: 176 MMHG

## 2023-01-25 VITALS — SYSTOLIC BLOOD PRESSURE: 162 MMHG | DIASTOLIC BLOOD PRESSURE: 95 MMHG

## 2023-01-25 VITALS — DIASTOLIC BLOOD PRESSURE: 92 MMHG | SYSTOLIC BLOOD PRESSURE: 173 MMHG

## 2023-01-25 VITALS — SYSTOLIC BLOOD PRESSURE: 163 MMHG | DIASTOLIC BLOOD PRESSURE: 108 MMHG

## 2023-01-25 VITALS — DIASTOLIC BLOOD PRESSURE: 87 MMHG | SYSTOLIC BLOOD PRESSURE: 185 MMHG

## 2023-01-25 VITALS — DIASTOLIC BLOOD PRESSURE: 68 MMHG | SYSTOLIC BLOOD PRESSURE: 173 MMHG

## 2023-01-25 VITALS — DIASTOLIC BLOOD PRESSURE: 70 MMHG | SYSTOLIC BLOOD PRESSURE: 193 MMHG

## 2023-01-25 VITALS — SYSTOLIC BLOOD PRESSURE: 131 MMHG | DIASTOLIC BLOOD PRESSURE: 96 MMHG

## 2023-01-25 VITALS — SYSTOLIC BLOOD PRESSURE: 161 MMHG | DIASTOLIC BLOOD PRESSURE: 85 MMHG

## 2023-01-25 VITALS — SYSTOLIC BLOOD PRESSURE: 157 MMHG | DIASTOLIC BLOOD PRESSURE: 86 MMHG

## 2023-01-25 VITALS — DIASTOLIC BLOOD PRESSURE: 93 MMHG | SYSTOLIC BLOOD PRESSURE: 178 MMHG

## 2023-01-25 VITALS — DIASTOLIC BLOOD PRESSURE: 72 MMHG | SYSTOLIC BLOOD PRESSURE: 184 MMHG

## 2023-01-25 VITALS — SYSTOLIC BLOOD PRESSURE: 185 MMHG | DIASTOLIC BLOOD PRESSURE: 88 MMHG

## 2023-01-25 VITALS — DIASTOLIC BLOOD PRESSURE: 93 MMHG | SYSTOLIC BLOOD PRESSURE: 140 MMHG

## 2023-01-25 VITALS — SYSTOLIC BLOOD PRESSURE: 165 MMHG | DIASTOLIC BLOOD PRESSURE: 99 MMHG

## 2023-01-25 VITALS — DIASTOLIC BLOOD PRESSURE: 74 MMHG | SYSTOLIC BLOOD PRESSURE: 155 MMHG

## 2023-01-25 VITALS — DIASTOLIC BLOOD PRESSURE: 61 MMHG | SYSTOLIC BLOOD PRESSURE: 168 MMHG

## 2023-01-25 VITALS — SYSTOLIC BLOOD PRESSURE: 171 MMHG | DIASTOLIC BLOOD PRESSURE: 72 MMHG

## 2023-01-25 VITALS — SYSTOLIC BLOOD PRESSURE: 185 MMHG | DIASTOLIC BLOOD PRESSURE: 146 MMHG

## 2023-01-25 VITALS — SYSTOLIC BLOOD PRESSURE: 162 MMHG | DIASTOLIC BLOOD PRESSURE: 87 MMHG

## 2023-01-25 VITALS — DIASTOLIC BLOOD PRESSURE: 75 MMHG | SYSTOLIC BLOOD PRESSURE: 190 MMHG

## 2023-01-25 VITALS — SYSTOLIC BLOOD PRESSURE: 183 MMHG | DIASTOLIC BLOOD PRESSURE: 114 MMHG

## 2023-01-25 VITALS — SYSTOLIC BLOOD PRESSURE: 183 MMHG | DIASTOLIC BLOOD PRESSURE: 93 MMHG

## 2023-01-25 VITALS — SYSTOLIC BLOOD PRESSURE: 178 MMHG | DIASTOLIC BLOOD PRESSURE: 99 MMHG

## 2023-01-25 VITALS — SYSTOLIC BLOOD PRESSURE: 174 MMHG | DIASTOLIC BLOOD PRESSURE: 101 MMHG

## 2023-01-25 VITALS — DIASTOLIC BLOOD PRESSURE: 94 MMHG | SYSTOLIC BLOOD PRESSURE: 155 MMHG

## 2023-01-25 VITALS — SYSTOLIC BLOOD PRESSURE: 141 MMHG | DIASTOLIC BLOOD PRESSURE: 78 MMHG

## 2023-01-25 VITALS — DIASTOLIC BLOOD PRESSURE: 36 MMHG | SYSTOLIC BLOOD PRESSURE: 180 MMHG

## 2023-01-25 VITALS — SYSTOLIC BLOOD PRESSURE: 173 MMHG | DIASTOLIC BLOOD PRESSURE: 93 MMHG

## 2023-01-25 VITALS — DIASTOLIC BLOOD PRESSURE: 88 MMHG | SYSTOLIC BLOOD PRESSURE: 151 MMHG

## 2023-01-25 VITALS — SYSTOLIC BLOOD PRESSURE: 132 MMHG | DIASTOLIC BLOOD PRESSURE: 83 MMHG

## 2023-01-25 VITALS — SYSTOLIC BLOOD PRESSURE: 155 MMHG | DIASTOLIC BLOOD PRESSURE: 92 MMHG

## 2023-01-25 VITALS — DIASTOLIC BLOOD PRESSURE: 90 MMHG | SYSTOLIC BLOOD PRESSURE: 165 MMHG

## 2023-01-25 VITALS — DIASTOLIC BLOOD PRESSURE: 85 MMHG | SYSTOLIC BLOOD PRESSURE: 177 MMHG

## 2023-01-25 VITALS — DIASTOLIC BLOOD PRESSURE: 107 MMHG | SYSTOLIC BLOOD PRESSURE: 185 MMHG

## 2023-01-25 VITALS — SYSTOLIC BLOOD PRESSURE: 183 MMHG | DIASTOLIC BLOOD PRESSURE: 102 MMHG

## 2023-01-25 VITALS — SYSTOLIC BLOOD PRESSURE: 211 MMHG | DIASTOLIC BLOOD PRESSURE: 87 MMHG

## 2023-01-25 VITALS — DIASTOLIC BLOOD PRESSURE: 88 MMHG | SYSTOLIC BLOOD PRESSURE: 187 MMHG

## 2023-01-25 VITALS — SYSTOLIC BLOOD PRESSURE: 175 MMHG | DIASTOLIC BLOOD PRESSURE: 96 MMHG

## 2023-01-25 VITALS — DIASTOLIC BLOOD PRESSURE: 79 MMHG | SYSTOLIC BLOOD PRESSURE: 145 MMHG

## 2023-01-25 VITALS — SYSTOLIC BLOOD PRESSURE: 179 MMHG | DIASTOLIC BLOOD PRESSURE: 98 MMHG

## 2023-01-25 VITALS — DIASTOLIC BLOOD PRESSURE: 75 MMHG | SYSTOLIC BLOOD PRESSURE: 186 MMHG

## 2023-01-25 VITALS — SYSTOLIC BLOOD PRESSURE: 177 MMHG | DIASTOLIC BLOOD PRESSURE: 88 MMHG

## 2023-01-25 VITALS — SYSTOLIC BLOOD PRESSURE: 164 MMHG | DIASTOLIC BLOOD PRESSURE: 99 MMHG

## 2023-01-25 VITALS — DIASTOLIC BLOOD PRESSURE: 88 MMHG | SYSTOLIC BLOOD PRESSURE: 142 MMHG

## 2023-01-25 VITALS — DIASTOLIC BLOOD PRESSURE: 91 MMHG | SYSTOLIC BLOOD PRESSURE: 162 MMHG

## 2023-01-25 VITALS — SYSTOLIC BLOOD PRESSURE: 175 MMHG | DIASTOLIC BLOOD PRESSURE: 81 MMHG

## 2023-01-25 LAB
BASE EXCESS BLDA CALC-SCNC: -7.2 MMOL/L
BASOPHILS # BLD AUTO: 0 K/UL (ref 0–0.2)
BASOPHILS NFR BLD AUTO: 0.3 % (ref 0–2)
BUN SERPL-MCNC: 85 MG/DL (ref 7–18)
CALCIUM SERPL-MCNC: 8.4 MG/DL (ref 8.5–10.1)
CHLORIDE SERPL-SCNC: 104 MMOL/L (ref 98–107)
CO2 SERPL-SCNC: 19 MMOL/L (ref 21–32)
CREAT SERPL-MCNC: 5.1 MG/DL (ref 0.6–1.3)
DO-HGB MFR BLDA: 101.6 MMHG
EOSINOPHIL NFR BLD AUTO: 0.7 % (ref 0–6)
GLUCOSE SERPL-MCNC: 109 MG/DL (ref 74–106)
HCT VFR BLD AUTO: 23 % (ref 39–51)
HGB BLD-MCNC: 7.4 G/DL (ref 13.5–17.5)
INHALED O2 CONCENTRATION: 30 %
INHALED O2 FLOW RATE: 2.5 L/MIN (ref 0–30)
LYMPHOCYTES NFR BLD AUTO: 1 K/UL (ref 0.8–4.8)
LYMPHOCYTES NFR BLD AUTO: 12.4 % (ref 20–44)
MCHC RBC AUTO-ENTMCNC: 33 G/DL (ref 31–36)
MCV RBC AUTO: 80 FL (ref 80–96)
MONOCYTES NFR BLD AUTO: 0.7 K/UL (ref 0.1–1.3)
MONOCYTES NFR BLD AUTO: 7.9 % (ref 2–12)
NEUTROPHILS # BLD AUTO: 6.5 K/UL (ref 1.8–8.9)
NEUTROPHILS NFR BLD AUTO: 78.7 % (ref 43–81)
PCO2 TEMP ADJ BLDA: 45.9 MMHG (ref 35–45)
PH TEMP ADJ BLDA: 7.25 [PH] (ref 7.35–7.45)
PLATELET # BLD AUTO: 456 K/UL (ref 150–450)
PO2 TEMP ADJ BLDA: 58.4 MMHG (ref 75–100)
POTASSIUM SERPL-SCNC: 5.2 MMOL/L (ref 3.5–5.1)
RBC # BLD AUTO: 2.81 MIL/UL (ref 4.5–6)
SAO2 % BLDA: 86.1 % (ref 92–98.5)
SODIUM SERPL-SCNC: 137 MMOL/L (ref 136–145)
VENTILATION MODE VENT: (no result)
WBC NRBC COR # BLD AUTO: 8.3 K/UL (ref 4.3–11)

## 2023-01-25 PROCEDURE — 05HF33Z INSERTION OF INFUSION DEVICE INTO LEFT CEPHALIC VEIN, PERCUTANEOUS APPROACH: ICD-10-PCS | Performed by: NURSE PRACTITIONER

## 2023-01-25 RX ADMIN — CYCLOBENZAPRINE HYDROCHLORIDE SCH MG: 10 TABLET, FILM COATED ORAL at 21:43

## 2023-01-25 RX ADMIN — DEXAMETHASONE SODIUM PHOSPHATE SCH MG: 10 INJECTION INTRAMUSCULAR; INTRAVENOUS at 08:14

## 2023-01-25 RX ADMIN — METOPROLOL SUCCINATE SCH MG: 50 TABLET, EXTENDED RELEASE ORAL at 08:13

## 2023-01-25 RX ADMIN — CYCLOBENZAPRINE HYDROCHLORIDE SCH MG: 10 TABLET, FILM COATED ORAL at 12:31

## 2023-01-25 RX ADMIN — HYDROCODONE BITARTRATE AND ACETAMINOPHEN PRN TAB: 10; 325 TABLET ORAL at 08:42

## 2023-01-25 RX ADMIN — Medication SCH EACH: at 08:19

## 2023-01-25 RX ADMIN — SODIUM CHLORIDE SCH MG: 9 INJECTION, SOLUTION INTRAVENOUS at 08:14

## 2023-01-25 RX ADMIN — ASPIRIN 81 MG SCH MG: 81 TABLET ORAL at 08:12

## 2023-01-25 RX ADMIN — HYDRALAZINE HYDROCHLORIDE PRN MG: 20 INJECTION INTRAMUSCULAR; INTRAVENOUS at 05:59

## 2023-01-25 RX ADMIN — HYDRALAZINE HYDROCHLORIDE PRN MG: 20 INJECTION INTRAMUSCULAR; INTRAVENOUS at 13:47

## 2023-01-25 RX ADMIN — AZITHROMYCIN DIHYDRATE SCH MG: 250 TABLET, FILM COATED ORAL at 08:12

## 2023-01-25 RX ADMIN — Medication SCH EACH: at 17:18

## 2023-01-25 RX ADMIN — Medication SCH EACH: at 12:29

## 2023-01-25 RX ADMIN — PREGABALIN SCH MG: 100 CAPSULE ORAL at 12:31

## 2023-01-25 RX ADMIN — PREGABALIN SCH MG: 100 CAPSULE ORAL at 17:17

## 2023-01-25 RX ADMIN — INSULIN HUMAN PRN UNITS: 100 INJECTION, SOLUTION PARENTERAL at 17:40

## 2023-01-25 RX ADMIN — HYDROCODONE BITARTRATE AND ACETAMINOPHEN PRN TAB: 10; 325 TABLET ORAL at 17:17

## 2023-01-25 RX ADMIN — Medication SCH EACH: at 22:36

## 2023-01-25 RX ADMIN — HUMAN INSULIN PRN UNIT: 100 INJECTION, SOLUTION SUBCUTANEOUS at 21:57

## 2023-01-25 RX ADMIN — ATORVASTATIN CALCIUM SCH MG: 40 TABLET, FILM COATED ORAL at 21:43

## 2023-01-25 RX ADMIN — CYCLOBENZAPRINE HYDROCHLORIDE SCH MG: 10 TABLET, FILM COATED ORAL at 17:18

## 2023-01-25 RX ADMIN — APIXABAN SCH MG: 5 TABLET, FILM COATED ORAL at 17:18

## 2023-01-25 RX ADMIN — PREGABALIN SCH MG: 100 CAPSULE ORAL at 08:13

## 2023-01-25 RX ADMIN — APIXABAN SCH MG: 5 TABLET, FILM COATED ORAL at 08:16

## 2023-01-25 RX ADMIN — CYCLOBENZAPRINE HYDROCHLORIDE SCH MG: 10 TABLET, FILM COATED ORAL at 08:13

## 2023-01-25 RX ADMIN — INSULIN HUMAN PRN UNITS: 100 INJECTION, SOLUTION PARENTERAL at 13:45

## 2023-01-25 RX ADMIN — HYDRALAZINE HYDROCHLORIDE PRN MG: 20 INJECTION INTRAMUSCULAR; INTRAVENOUS at 20:34

## 2023-01-25 NOTE — NUR
RN NOTES

 ADMINISTERED NARCO 10/325 MG PO PRN FOR LOWER BACK PAIN  8/10 PER PAIN SLIDING SCALE, PER 
PATIENT  REQUEST. BS-281 MG/DL COVERAGE GIVEN. PATIENT EATING DINNER.

## 2023-01-25 NOTE — NUR
RN NOTES

 PATIENT ON NC-3L, NO ACUTE RESPIRATORY DISTRESS, MEDICATION WERE ADMINISTERED FOR PAIN 
EFFECTIVE, TOLERATED DINNER WELL CALL LIGHT WITHIN TO REACH. URINE OUTPUT WAS 1550ML. IV 
ACCESS MIDLINE ON MARY INTACT. ENDORSED ONCOMING NURSE PRATEEK.

## 2023-01-25 NOTE — NUR
RN NOTES

 /109, P-71, ADMINISTERED HYDRALAZINE 10MG/ML IV PUSH,  BS-207 MG/DL COVERAGE GIVEN, 
AND SCHEDULED MEDICATION.  PATIENT CALM AND COOPERATIVE, NO BEHAVIORAL ISSUE, REDIRECTABLE.  
TOLERATING LUNCH SELF. WILL FOLLOW UP.

## 2023-01-25 NOTE — NUR
PT REFUSED TO GO ON BIPAP. EXPLAINED THE BENEFIT OF THE MACHINE AND PT SAID HE'S OK HE DOES 
NOT WANT TO USE IT. RN NOTIFIED. WILL CONTINUE TO MONITOR.

## 2023-01-25 NOTE — NUR
RN NOTES 

ADMINISTERED NARCO 10/325 MG PO PRN FOR LOWER BACK PAIN PER PAIENT REQUEST 8/10 PER PAIN 
SCALE. /88, P-86. R-11.

## 2023-01-25 NOTE — NUR
ICU/RN:



CRITICAL BUN 85 RESULTS RTELAYED TO VETO ALFONSO. PT MEDICATED FOR HYPERTENSION AS 
PRESCRIBED.

## 2023-01-25 NOTE — NUR
RN OPENING NOTES



RECEIVED PATIENT ON BED, AWAKE ALERT, VERBALLY RESPONSIVE, CURRENTLY ON ROOM AIR SATING AT 
91%, RESPIRATORY EVEN AND UNLABORED, NO S/S OF DISTRESS NOTED. PATIENT HAS MARY MIDLINE, 
FLUSHED WITH NS, NO S/S OF INFILTRATION NOTED AT THIS TIME. VILLALTA CATHETER INTACT, DRAINING 
WITH CLEAR YELLOW URINE VIA GRAVITY, ALL SAFETY PRECAUTION PROVIDED. BED IN LOWEST POSITION, 
LOCKED. BED ALARM ARMED. CALL LIGHT WITH IN REACH.

## 2023-01-25 NOTE — NUR
ICU/RN:



CHARGE RN GIANFRANCO PLACED TWO IV LINES. LEFT THUMB #22 AND RIGHT HAND #22. SHE ALSO PLACED 
COUDE CATH. DR. ALVAREZ GAVE ORDER FOR 120MG LASIX IVP TIMES ONE. ADMINISTERED AS ORDERED.

## 2023-01-25 NOTE — NUR
RN NOTES 

 RECEIVED PATIENT ON BIPAP, 20/10, FIO2-30%,  PATIENT AWAKE,  GET NEW ORDER TO CHANGE BIPAP 
TO THE NC PER Dr SALINAS.  PATIENT ON HARD RESTRAIN,  A/O X3, 1:1 SITTER NEXT TO THE BED, 
PATIENT ON HARD RESTRAIN.   PATIENT STATE "I DO NOT REMEMBER NOTHING", AND  WANTED RESTRAIN 
TO BE REMOVED.  RELEASED RESTRAIN VIA DR COY ORDER SKIN ASSESSMENT DONE. ADMINISTERED 
SCHEDULED MEDICATION. PATIENT WAS COMPLAINING OF LOWER BACK PAIN ASKING PAIN MEDICATION.  
REMOVED HARD RESTRAIN.  PATIENT CALM AND COOPERATIVE WATCHING TV. CALL LIGHT WITHIN TO 
REACH. MONITORING CLOSELY. PATIENT HAS ISOLATION OF COVID.

## 2023-01-25 NOTE — NUR
RN NOTES



NOTED WITH BP- 173/93, PULSE- 94, DENIES ANY H/A AND DIZZINESS, HYDRALAZINE 10 MG IV GIVEN.

## 2023-01-25 NOTE — NUR
ICU RN

Received patient AA/OX3 on high fowlers position.VS stable.Respiration even and unlabored 

saturation 88%-93%.SR.Patient refused BIPAP.Patient denies pain or any discomfort.With 

bilateral leg swelling kept elevated on pillows.FC to gravity.Continue monitoring.

## 2023-01-26 VITALS — DIASTOLIC BLOOD PRESSURE: 97 MMHG | SYSTOLIC BLOOD PRESSURE: 135 MMHG

## 2023-01-26 VITALS — DIASTOLIC BLOOD PRESSURE: 81 MMHG | SYSTOLIC BLOOD PRESSURE: 165 MMHG

## 2023-01-26 VITALS — SYSTOLIC BLOOD PRESSURE: 170 MMHG | DIASTOLIC BLOOD PRESSURE: 86 MMHG

## 2023-01-26 VITALS — DIASTOLIC BLOOD PRESSURE: 86 MMHG | SYSTOLIC BLOOD PRESSURE: 173 MMHG

## 2023-01-26 VITALS — DIASTOLIC BLOOD PRESSURE: 80 MMHG | SYSTOLIC BLOOD PRESSURE: 155 MMHG

## 2023-01-26 VITALS — SYSTOLIC BLOOD PRESSURE: 161 MMHG | DIASTOLIC BLOOD PRESSURE: 91 MMHG

## 2023-01-26 VITALS — SYSTOLIC BLOOD PRESSURE: 144 MMHG | DIASTOLIC BLOOD PRESSURE: 91 MMHG

## 2023-01-26 VITALS — DIASTOLIC BLOOD PRESSURE: 93 MMHG | SYSTOLIC BLOOD PRESSURE: 169 MMHG

## 2023-01-26 VITALS — SYSTOLIC BLOOD PRESSURE: 150 MMHG | DIASTOLIC BLOOD PRESSURE: 84 MMHG

## 2023-01-26 VITALS — DIASTOLIC BLOOD PRESSURE: 62 MMHG | SYSTOLIC BLOOD PRESSURE: 91 MMHG

## 2023-01-26 VITALS — SYSTOLIC BLOOD PRESSURE: 170 MMHG | DIASTOLIC BLOOD PRESSURE: 98 MMHG

## 2023-01-26 VITALS — SYSTOLIC BLOOD PRESSURE: 145 MMHG | DIASTOLIC BLOOD PRESSURE: 78 MMHG

## 2023-01-26 VITALS — DIASTOLIC BLOOD PRESSURE: 93 MMHG | SYSTOLIC BLOOD PRESSURE: 181 MMHG

## 2023-01-26 VITALS — DIASTOLIC BLOOD PRESSURE: 71 MMHG | SYSTOLIC BLOOD PRESSURE: 164 MMHG

## 2023-01-26 VITALS — DIASTOLIC BLOOD PRESSURE: 84 MMHG | SYSTOLIC BLOOD PRESSURE: 182 MMHG

## 2023-01-26 VITALS — DIASTOLIC BLOOD PRESSURE: 98 MMHG | SYSTOLIC BLOOD PRESSURE: 170 MMHG

## 2023-01-26 LAB
BASE EXCESS BLDA CALC-SCNC: -5 MMOL/L
BASOPHILS # BLD AUTO: 0 K/UL (ref 0–0.2)
BASOPHILS NFR BLD AUTO: 0.2 % (ref 0–2)
BUN SERPL-MCNC: 81 MG/DL (ref 7–18)
CALCIUM SERPL-MCNC: 8 MG/DL (ref 8.5–10.1)
CHLORIDE SERPL-SCNC: 105 MMOL/L (ref 98–107)
CO2 SERPL-SCNC: 22 MMOL/L (ref 21–32)
CREAT SERPL-MCNC: 4.9 MG/DL (ref 0.6–1.3)
DO-HGB MFR BLDA: 31.2 MMHG
EOSINOPHIL NFR BLD AUTO: 0.2 % (ref 0–6)
GLUCOSE SERPL-MCNC: 269 MG/DL (ref 74–106)
HCT VFR BLD AUTO: 23 % (ref 39–51)
HGB BLD-MCNC: 7.6 G/DL (ref 13.5–17.5)
INHALED O2 CONCENTRATION: 21 %
LYMPHOCYTES NFR BLD AUTO: 0.8 K/UL (ref 0.8–4.8)
LYMPHOCYTES NFR BLD AUTO: 8.1 % (ref 20–44)
MCHC RBC AUTO-ENTMCNC: 33 G/DL (ref 31–36)
MCV RBC AUTO: 80 FL (ref 80–96)
MONOCYTES NFR BLD AUTO: 0.8 K/UL (ref 0.1–1.3)
MONOCYTES NFR BLD AUTO: 7.5 % (ref 2–12)
NEUTROPHILS # BLD AUTO: 8.7 K/UL (ref 1.8–8.9)
NEUTROPHILS NFR BLD AUTO: 84 % (ref 43–81)
PCO2 TEMP ADJ BLDA: 39.8 MMHG (ref 35–45)
PH TEMP ADJ BLDA: 7.33 [PH] (ref 7.35–7.45)
PLATELET # BLD AUTO: 497 K/UL (ref 150–450)
PO2 TEMP ADJ BLDA: 70.9 MMHG (ref 75–100)
POTASSIUM SERPL-SCNC: 5.2 MMOL/L (ref 3.5–5.1)
RBC # BLD AUTO: 2.86 MIL/UL (ref 4.5–6)
SAO2 % BLDA: 91.5 % (ref 92–98.5)
SODIUM SERPL-SCNC: 138 MMOL/L (ref 136–145)
VENTILATION MODE VENT: (no result)
WBC NRBC COR # BLD AUTO: 10.4 K/UL (ref 4.3–11)

## 2023-01-26 RX ADMIN — APIXABAN SCH MG: 5 TABLET, FILM COATED ORAL at 08:05

## 2023-01-26 RX ADMIN — HYDRALAZINE HYDROCHLORIDE PRN MG: 20 INJECTION INTRAMUSCULAR; INTRAVENOUS at 12:44

## 2023-01-26 RX ADMIN — Medication SCH EACH: at 21:55

## 2023-01-26 RX ADMIN — PREGABALIN SCH MG: 100 CAPSULE ORAL at 12:44

## 2023-01-26 RX ADMIN — HYDROCODONE BITARTRATE AND ACETAMINOPHEN PRN TAB: 10; 325 TABLET ORAL at 17:04

## 2023-01-26 RX ADMIN — ASPIRIN 81 MG SCH MG: 81 TABLET ORAL at 08:03

## 2023-01-26 RX ADMIN — HUMAN INSULIN PRN UNIT: 100 INJECTION, SOLUTION SUBCUTANEOUS at 22:01

## 2023-01-26 RX ADMIN — Medication SCH EACH: at 17:08

## 2023-01-26 RX ADMIN — HYDROCODONE BITARTRATE AND ACETAMINOPHEN PRN TAB: 10; 325 TABLET ORAL at 04:58

## 2023-01-26 RX ADMIN — APIXABAN SCH MG: 5 TABLET, FILM COATED ORAL at 17:15

## 2023-01-26 RX ADMIN — PREGABALIN SCH MG: 100 CAPSULE ORAL at 17:15

## 2023-01-26 RX ADMIN — METOPROLOL SUCCINATE SCH MG: 50 TABLET, EXTENDED RELEASE ORAL at 08:04

## 2023-01-26 RX ADMIN — INSULIN HUMAN PRN UNITS: 100 INJECTION, SOLUTION PARENTERAL at 08:29

## 2023-01-26 RX ADMIN — CYCLOBENZAPRINE HYDROCHLORIDE SCH MG: 10 TABLET, FILM COATED ORAL at 08:06

## 2023-01-26 RX ADMIN — INSULIN HUMAN PRN UNITS: 100 INJECTION, SOLUTION PARENTERAL at 13:06

## 2023-01-26 RX ADMIN — SODIUM POLYSTYRENE SULFONATE SCH GM: 1 POWDER ORAL; RECTAL at 10:21

## 2023-01-26 RX ADMIN — DEXAMETHASONE SODIUM PHOSPHATE SCH MG: 10 INJECTION INTRAMUSCULAR; INTRAVENOUS at 08:03

## 2023-01-26 RX ADMIN — Medication SCH EACH: at 07:48

## 2023-01-26 RX ADMIN — HYDRALAZINE HYDROCHLORIDE PRN MG: 20 INJECTION INTRAMUSCULAR; INTRAVENOUS at 04:45

## 2023-01-26 RX ADMIN — ATORVASTATIN CALCIUM SCH MG: 40 TABLET, FILM COATED ORAL at 21:22

## 2023-01-26 RX ADMIN — CYCLOBENZAPRINE HYDROCHLORIDE SCH MG: 10 TABLET, FILM COATED ORAL at 12:44

## 2023-01-26 RX ADMIN — Medication SCH EACH: at 12:38

## 2023-01-26 RX ADMIN — HYDRALAZINE HYDROCHLORIDE PRN MG: 20 INJECTION INTRAMUSCULAR; INTRAVENOUS at 22:03

## 2023-01-26 RX ADMIN — HYDRALAZINE HYDROCHLORIDE PRN MG: 20 INJECTION INTRAMUSCULAR; INTRAVENOUS at 17:16

## 2023-01-26 RX ADMIN — SODIUM CHLORIDE SCH MG: 9 INJECTION, SOLUTION INTRAVENOUS at 08:03

## 2023-01-26 RX ADMIN — CYCLOBENZAPRINE HYDROCHLORIDE SCH MG: 10 TABLET, FILM COATED ORAL at 21:22

## 2023-01-26 RX ADMIN — INSULIN HUMAN PRN UNITS: 100 INJECTION, SOLUTION PARENTERAL at 17:36

## 2023-01-26 RX ADMIN — CYCLOBENZAPRINE HYDROCHLORIDE SCH MG: 10 TABLET, FILM COATED ORAL at 17:15

## 2023-01-26 RX ADMIN — PREGABALIN SCH MG: 100 CAPSULE ORAL at 08:03

## 2023-01-26 NOTE — NUR
ICU NOTES

Patient resting.BP elevated PRN medication administered Complaints of back pain medicated as 


ordered.Bed bath rendered.Complete lines changed.No acute distress noted.Kept comfortable.

Safety measure implemented.Call light at bedside.

## 2023-01-26 NOTE — NUR
RN OPENING NOTES



RECEIVED PT IN BED, AWAKE, WATCHING TV. AOx2-3, WITH PERIODS OF FORGETFULNESS AND CONFUSION. 
ON RA AND TOLERATING WELL. NO SOB NOTED. NO S/SX OF RESPIRATORY DISTRESS NOTED. IV ACCESS IN 
MARY MIDLINE. IV IS INTACT, PATENT, AND FLUSHING WELL. SAFETY PRECAUTIONS IN PLACE: BED IN 
LOWEST, LOCKED POSITION, SIDERAILS UPx2, AND BRAKES ON. TABLE AND CALL LIGHT WITHIN REACH. 
ALL NEEDS MET AT THIS TIME.

## 2023-01-26 NOTE — NUR
RN NOTE



RECEIVED PATIENT FROM ICU ACCOMPANIED BY GINNY YOUNG, AND GINNY URBINA. RECEIVED BEDSIDE 
REPORT. PATIENT A/OX4. VS: ORAL TEMP 98.0; RR: 22; HR 76; OS SAT 95% ON ROOM AIR; BP: 
161/91. VILLALTA CATHETER IN PLACE DRAINING VIA GRAVITY MAGDALENA URINE. MARY MIDLINE RUNNING BUMEX 
AT 10ML/ HR. ALL SAFETY MEASURES IN PLACE, BED LOCKED IN LOWEST POSITION, WILL CONTINUE TO 
MONITOR.

## 2023-01-26 NOTE — NUR
RN note - transfer to telemetry



After assessment of ABG, Dr. ahn said that patient is safe to be transferred to Telemetry. 


Cardiac monitor showed SR HR 80/min. BP stable. Spo2 93% RA, RR~20/min. 

Butex has been commenced for his heart failure at 10mL/hr, given via middline. Hernandez 
collected 500mL yellowish fluid this morning. 

3+ pitting edema noted over bilateral LL. Patient is not in respiratory distress. 

K 5.2mmol/L, with sodium polystyrene 15g given. Keep observation of urine output and SARAN. 



Patient is transferred to [facility] bed 103. Handover is given to GINNY Ramirez.

## 2023-01-26 NOTE — NUR
RN NOTE



CONFISCATED CIGARETTES AND LIGHTER FROM PATIENT. KEPT IN PERSONAL BELONGINGS BAG IN NURSES 
STATION. CHARGE NURSE AWARE.

## 2023-01-26 NOTE — NUR
RN note



Received patient in bed. Alert and conscious without active complaint. Cardiac monitor 
showed SR HR 80/min. BP stable. Not in respiratory distress RA, RR 20/min. Call bell is 
placed within reach. Bed is locked and placed in the lowest position. Will continue 
monitoring and care.

## 2023-01-26 NOTE — NUR
RN NOTE



PATIENT IN BED, A/OX3. BREATHING ON ROOM AIR O2 SAT OF 98%. TELE READING SR HR 90. ALL 
SAFETY MEASURES IN PLACE, BED LOCKED IN LOWEST POSITION, CALL LIGHT WITHIN REACH. WILL 
ENDORSE CONTINUITY OF CARE TO NIGHT SHIFT.

## 2023-01-27 VITALS — SYSTOLIC BLOOD PRESSURE: 175 MMHG | DIASTOLIC BLOOD PRESSURE: 90 MMHG

## 2023-01-27 VITALS — SYSTOLIC BLOOD PRESSURE: 158 MMHG | DIASTOLIC BLOOD PRESSURE: 72 MMHG

## 2023-01-27 VITALS — SYSTOLIC BLOOD PRESSURE: 136 MMHG | DIASTOLIC BLOOD PRESSURE: 78 MMHG

## 2023-01-27 VITALS — SYSTOLIC BLOOD PRESSURE: 174 MMHG | DIASTOLIC BLOOD PRESSURE: 86 MMHG

## 2023-01-27 VITALS — SYSTOLIC BLOOD PRESSURE: 179 MMHG | DIASTOLIC BLOOD PRESSURE: 86 MMHG

## 2023-01-27 VITALS — SYSTOLIC BLOOD PRESSURE: 180 MMHG | DIASTOLIC BLOOD PRESSURE: 88 MMHG

## 2023-01-27 LAB
BASOPHILS # BLD AUTO: 0.1 K/UL (ref 0–0.2)
BASOPHILS NFR BLD AUTO: 0.6 % (ref 0–2)
BUN SERPL-MCNC: 85 MG/DL (ref 7–18)
CALCIUM SERPL-MCNC: 8.1 MG/DL (ref 8.5–10.1)
CHLORIDE SERPL-SCNC: 103 MMOL/L (ref 98–107)
CO2 SERPL-SCNC: 23 MMOL/L (ref 21–32)
CREAT SERPL-MCNC: 4.6 MG/DL (ref 0.6–1.3)
EOSINOPHIL NFR BLD AUTO: 0.8 % (ref 0–6)
GLUCOSE SERPL-MCNC: 230 MG/DL (ref 74–106)
HCT VFR BLD AUTO: 24 % (ref 39–51)
HGB BLD-MCNC: 7.8 G/DL (ref 13.5–17.5)
LYMPHOCYTES NFR BLD AUTO: 1.5 K/UL (ref 0.8–4.8)
LYMPHOCYTES NFR BLD AUTO: 13.6 % (ref 20–44)
MCHC RBC AUTO-ENTMCNC: 32 G/DL (ref 31–36)
MCV RBC AUTO: 80 FL (ref 80–96)
MONOCYTES NFR BLD AUTO: 0.9 K/UL (ref 0.1–1.3)
MONOCYTES NFR BLD AUTO: 8.4 % (ref 2–12)
NEUTROPHILS # BLD AUTO: 8.5 K/UL (ref 1.8–8.9)
NEUTROPHILS NFR BLD AUTO: 76.6 % (ref 43–81)
PLATELET # BLD AUTO: 478 K/UL (ref 150–450)
POTASSIUM SERPL-SCNC: 4.7 MMOL/L (ref 3.5–5.1)
RBC # BLD AUTO: 3.03 MIL/UL (ref 4.5–6)
SODIUM SERPL-SCNC: 137 MMOL/L (ref 136–145)
WBC NRBC COR # BLD AUTO: 11.1 K/UL (ref 4.3–11)

## 2023-01-27 RX ADMIN — PREGABALIN SCH MG: 100 CAPSULE ORAL at 13:39

## 2023-01-27 RX ADMIN — ISOSORBIDE DINITRATE SCH MG: 20 TABLET ORAL at 17:39

## 2023-01-27 RX ADMIN — ASPIRIN 81 MG SCH MG: 81 TABLET ORAL at 08:52

## 2023-01-27 RX ADMIN — PREGABALIN SCH MG: 100 CAPSULE ORAL at 17:39

## 2023-01-27 RX ADMIN — Medication SCH EACH: at 13:06

## 2023-01-27 RX ADMIN — CYCLOBENZAPRINE HYDROCHLORIDE SCH MG: 10 TABLET, FILM COATED ORAL at 13:38

## 2023-01-27 RX ADMIN — SODIUM CHLORIDE SCH MG: 9 INJECTION, SOLUTION INTRAVENOUS at 17:43

## 2023-01-27 RX ADMIN — APIXABAN SCH MG: 5 TABLET, FILM COATED ORAL at 08:53

## 2023-01-27 RX ADMIN — SODIUM CHLORIDE SCH MG: 9 INJECTION, SOLUTION INTRAVENOUS at 10:41

## 2023-01-27 RX ADMIN — CYCLOBENZAPRINE HYDROCHLORIDE SCH MG: 10 TABLET, FILM COATED ORAL at 08:52

## 2023-01-27 RX ADMIN — INSULIN HUMAN PRN UNITS: 100 INJECTION, SOLUTION PARENTERAL at 18:44

## 2023-01-27 RX ADMIN — INSULIN HUMAN PRN UNITS: 100 INJECTION, SOLUTION PARENTERAL at 14:21

## 2023-01-27 RX ADMIN — OXYCODONE HYDROCHLORIDE AND ACETAMINOPHEN PRN UDTAB: 5; 325 TABLET ORAL at 21:26

## 2023-01-27 RX ADMIN — CYCLOBENZAPRINE HYDROCHLORIDE SCH MG: 10 TABLET, FILM COATED ORAL at 17:41

## 2023-01-27 RX ADMIN — ATORVASTATIN CALCIUM SCH MG: 40 TABLET, FILM COATED ORAL at 21:05

## 2023-01-27 RX ADMIN — PREGABALIN SCH MG: 100 CAPSULE ORAL at 08:52

## 2023-01-27 RX ADMIN — METOLAZONE SCH MG: 2.5 TABLET ORAL at 10:41

## 2023-01-27 RX ADMIN — Medication SCH EACH: at 17:34

## 2023-01-27 RX ADMIN — SODIUM CHLORIDE SCH MG: 9 INJECTION, SOLUTION INTRAVENOUS at 13:38

## 2023-01-27 RX ADMIN — HUMAN INSULIN PRN UNIT: 100 INJECTION, SOLUTION SUBCUTANEOUS at 21:28

## 2023-01-27 RX ADMIN — OXYCODONE HYDROCHLORIDE AND ACETAMINOPHEN PRN UDTAB: 5; 325 TABLET ORAL at 14:18

## 2023-01-27 RX ADMIN — Medication SCH EACH: at 21:25

## 2023-01-27 RX ADMIN — METOPROLOL SUCCINATE SCH MG: 50 TABLET, EXTENDED RELEASE ORAL at 08:52

## 2023-01-27 RX ADMIN — SODIUM POLYSTYRENE SULFONATE SCH GM: 1 POWDER ORAL; RECTAL at 08:51

## 2023-01-27 RX ADMIN — CYCLOBENZAPRINE HYDROCHLORIDE SCH MG: 10 TABLET, FILM COATED ORAL at 21:05

## 2023-01-27 RX ADMIN — INSULIN HUMAN PRN UNITS: 100 INJECTION, SOLUTION PARENTERAL at 08:54

## 2023-01-27 RX ADMIN — HYDRALAZINE HYDROCHLORIDE PRN MG: 20 INJECTION INTRAMUSCULAR; INTRAVENOUS at 05:06

## 2023-01-27 RX ADMIN — Medication SCH EACH: at 07:51

## 2023-01-27 RX ADMIN — ISOSORBIDE DINITRATE SCH MG: 20 TABLET ORAL at 10:42

## 2023-01-27 RX ADMIN — SODIUM CHLORIDE SCH MG: 9 INJECTION, SOLUTION INTRAVENOUS at 08:50

## 2023-01-27 RX ADMIN — APIXABAN SCH MG: 5 TABLET, FILM COATED ORAL at 17:45

## 2023-01-27 RX ADMIN — DEXAMETHASONE SODIUM PHOSPHATE SCH MG: 10 INJECTION INTRAMUSCULAR; INTRAVENOUS at 08:51

## 2023-01-27 NOTE — NUR
RN NOTES. INITIAL ASSESSMENT. RECEIVED THE PT REST IN BED. SLEEPING. PT IS LETHARGIC. PT IS 
ROOM AIR. SAT IS 98%. NO ACUTE DISTRESS NOTED. CARDIAC MONITOR SHOWING NSR.IV LT UPPER ARM 
MID LINE.HOB ELEVATED. WILL CONTINUE TO MONITOR VITALS.

## 2023-01-27 NOTE — NUR
RN CLOSING NOTES



PT IN BED, AOx2-3, WITH PERIODS OF FORGETFULNESS AND CONFUSION. BREATHING ON RA AND 
TOLERATING WELL. NO SOB NOTED. NO S/SX OF RESPIRATORY DISTRESS NOTED. IV ACCESS IN MARY 
MIDLINE. IV IS INTACT, PATENT, AND FLUSHING WELL. ALL ORDERS CARRIED OUT. ALL NEEDS MET. PT 
KEPT CLEAN AND DRY. SAFETY PRECAUTIONS IN PLACE: BED IN LOWEST, LOCKED POSITION, SIDERAILS 
UPx2, AND BRAKES ON. TABLE AND CALL LIGHT WITHIN REACH. WILL ENDORSE TO ONCOMING SHIFT FOR 
PRATEEK.

## 2023-01-27 NOTE — NUR
RN CLOSING NOTES



PT IN BED, AWAKE, WATCHING TV. AOx2-3, WITH PERIODS OF FORGETFULNESS AND CONFUSION. ON RA 
AND TOLERATING WELL. NO SOB NOTED. NO S/SX OF RESPIRATORY DISTRESS NOTED. IV ACCESS IN MARY 
MIDLINE. IV IS INTACT, PATENT, AND FLUSHING WELL. ALL ORDERS CARRIED OUT. ALL NEEDS MET. PT 
KEPT CLEAN AND DRY. SAFETY PRECAUTIONS IN PLACE: BED IN LOWEST, LOCKED POSITION, SIDERAILS 
UPx2, AND BRAKES ON. TABLE AND CALL LIGHT WITHIN REACH. WILL ENDORSE TO ONCOMING SHIFT FOR 
PRATEEK.

## 2023-01-28 VITALS — DIASTOLIC BLOOD PRESSURE: 62 MMHG | SYSTOLIC BLOOD PRESSURE: 111 MMHG

## 2023-01-28 VITALS — DIASTOLIC BLOOD PRESSURE: 68 MMHG | SYSTOLIC BLOOD PRESSURE: 130 MMHG

## 2023-01-28 VITALS — SYSTOLIC BLOOD PRESSURE: 111 MMHG | DIASTOLIC BLOOD PRESSURE: 62 MMHG

## 2023-01-28 VITALS — SYSTOLIC BLOOD PRESSURE: 136 MMHG | DIASTOLIC BLOOD PRESSURE: 78 MMHG

## 2023-01-28 LAB
ALBUMIN SERPL BCP-MCNC: 1.7 G/DL (ref 3.4–5)
ALP SERPL-CCNC: 134 U/L (ref 46–116)
ALT SERPL W P-5'-P-CCNC: 25 U/L (ref 12–78)
AST SERPL W P-5'-P-CCNC: 22 U/L (ref 15–37)
BASOPHILS # BLD AUTO: 0 K/UL (ref 0–0.2)
BASOPHILS NFR BLD AUTO: 0.3 % (ref 0–2)
BILIRUB SERPL-MCNC: 0.2 MG/DL (ref 0.2–1)
BUN SERPL-MCNC: 85 MG/DL (ref 7–18)
CALCIUM SERPL-MCNC: 8.1 MG/DL (ref 8.5–10.1)
CHLORIDE SERPL-SCNC: 105 MMOL/L (ref 98–107)
CO2 SERPL-SCNC: 23 MMOL/L (ref 21–32)
CREAT SERPL-MCNC: 4.4 MG/DL (ref 0.6–1.3)
EOSINOPHIL NFR BLD AUTO: 1.4 % (ref 0–6)
GLUCOSE SERPL-MCNC: 296 MG/DL (ref 74–106)
HCT VFR BLD AUTO: 22 % (ref 39–51)
HGB BLD-MCNC: 7.1 G/DL (ref 13.5–17.5)
LYMPHOCYTES NFR BLD AUTO: 1.7 K/UL (ref 0.8–4.8)
LYMPHOCYTES NFR BLD AUTO: 15.3 % (ref 20–44)
MCHC RBC AUTO-ENTMCNC: 33 G/DL (ref 31–36)
MCV RBC AUTO: 80 FL (ref 80–96)
MONOCYTES NFR BLD AUTO: 0.9 K/UL (ref 0.1–1.3)
MONOCYTES NFR BLD AUTO: 7.9 % (ref 2–12)
NEUTROPHILS # BLD AUTO: 8.2 K/UL (ref 1.8–8.9)
NEUTROPHILS NFR BLD AUTO: 75.1 % (ref 43–81)
PLATELET # BLD AUTO: 436 K/UL (ref 150–450)
POTASSIUM SERPL-SCNC: 4.4 MMOL/L (ref 3.5–5.1)
PROT SERPL-MCNC: 5.8 G/DL (ref 6.4–8.2)
RBC # BLD AUTO: 2.69 MIL/UL (ref 4.5–6)
SODIUM SERPL-SCNC: 140 MMOL/L (ref 136–145)
WBC NRBC COR # BLD AUTO: 10.9 K/UL (ref 4.3–11)

## 2023-01-28 RX ADMIN — DEXAMETHASONE SODIUM PHOSPHATE SCH MG: 10 INJECTION INTRAMUSCULAR; INTRAVENOUS at 09:33

## 2023-01-28 RX ADMIN — INSULIN HUMAN PRN UNITS: 100 INJECTION, SOLUTION PARENTERAL at 10:14

## 2023-01-28 RX ADMIN — Medication SCH EACH: at 08:32

## 2023-01-28 RX ADMIN — OXYCODONE HYDROCHLORIDE AND ACETAMINOPHEN PRN UDTAB: 5; 325 TABLET ORAL at 10:15

## 2023-01-28 RX ADMIN — ASPIRIN 81 MG SCH MG: 81 TABLET ORAL at 09:35

## 2023-01-28 RX ADMIN — SODIUM CHLORIDE SCH MG: 9 INJECTION, SOLUTION INTRAVENOUS at 09:33

## 2023-01-28 RX ADMIN — PREGABALIN SCH MG: 100 CAPSULE ORAL at 09:35

## 2023-01-28 RX ADMIN — ISOSORBIDE DINITRATE SCH MG: 20 TABLET ORAL at 09:34

## 2023-01-28 RX ADMIN — METOLAZONE SCH MG: 2.5 TABLET ORAL at 09:36

## 2023-01-28 RX ADMIN — METOPROLOL SUCCINATE SCH MG: 50 TABLET, EXTENDED RELEASE ORAL at 09:36

## 2023-01-28 RX ADMIN — SODIUM POLYSTYRENE SULFONATE SCH GM: 1 POWDER ORAL; RECTAL at 09:36

## 2023-01-28 RX ADMIN — CYCLOBENZAPRINE HYDROCHLORIDE SCH MG: 10 TABLET, FILM COATED ORAL at 09:35

## 2023-01-28 NOTE — NUR
AM CARE GIVEN. REMAINING SAME ROOM AIR TOLERATED WELL. SAT 98%. NO ACUTE DISTRESS NOTED, 
CARDIAC MONITOR SHOWING NSR, TKO RUNNING. HOB ELEVATED. FC PATENT, URINE DRAINING. WILL 
CONTINUE TO MONITOR VITALS.

## 2023-01-28 NOTE — NUR
RN NOTE



PATIENT REQUESTS TO LEAVE AGAINST MEDICAL ADVICE, SIGNED FORM. REFUSED TO SIGN BELONGINGS 
LIST. MD AWARE. CHARGE NURSE AWARE.

## 2023-02-04 ENCOUNTER — HOSPITAL ENCOUNTER (INPATIENT)
Dept: HOSPITAL 54 - ER | Age: 48
LOS: 9 days | Discharge: HOME | DRG: 177 | End: 2023-02-13
Attending: NURSE PRACTITIONER | Admitting: INTERNAL MEDICINE
Payer: MEDICARE

## 2023-02-04 VITALS — HEIGHT: 74 IN | WEIGHT: 305 LBS | BODY MASS INDEX: 39.14 KG/M2

## 2023-02-04 DIAGNOSIS — Z79.899: ICD-10-CM

## 2023-02-04 DIAGNOSIS — G89.29: ICD-10-CM

## 2023-02-04 DIAGNOSIS — E86.1: ICD-10-CM

## 2023-02-04 DIAGNOSIS — I50.23: ICD-10-CM

## 2023-02-04 DIAGNOSIS — E11.22: ICD-10-CM

## 2023-02-04 DIAGNOSIS — N17.0: ICD-10-CM

## 2023-02-04 DIAGNOSIS — E66.01: ICD-10-CM

## 2023-02-04 DIAGNOSIS — J44.9: ICD-10-CM

## 2023-02-04 DIAGNOSIS — I12.9: ICD-10-CM

## 2023-02-04 DIAGNOSIS — E11.42: ICD-10-CM

## 2023-02-04 DIAGNOSIS — Z98.890: ICD-10-CM

## 2023-02-04 DIAGNOSIS — J15.9: ICD-10-CM

## 2023-02-04 DIAGNOSIS — D50.9: ICD-10-CM

## 2023-02-04 DIAGNOSIS — Z79.84: ICD-10-CM

## 2023-02-04 DIAGNOSIS — G93.41: ICD-10-CM

## 2023-02-04 DIAGNOSIS — J90: ICD-10-CM

## 2023-02-04 DIAGNOSIS — Z79.82: ICD-10-CM

## 2023-02-04 DIAGNOSIS — J69.0: Primary | ICD-10-CM

## 2023-02-04 DIAGNOSIS — Z86.16: ICD-10-CM

## 2023-02-04 DIAGNOSIS — Z79.4: ICD-10-CM

## 2023-02-04 DIAGNOSIS — E11.649: ICD-10-CM

## 2023-02-04 DIAGNOSIS — G47.33: ICD-10-CM

## 2023-02-04 DIAGNOSIS — N28.1: ICD-10-CM

## 2023-02-04 DIAGNOSIS — E87.5: ICD-10-CM

## 2023-02-04 DIAGNOSIS — Z83.3: ICD-10-CM

## 2023-02-04 DIAGNOSIS — Z98.1: ICD-10-CM

## 2023-02-04 DIAGNOSIS — Z20.822: ICD-10-CM

## 2023-02-04 DIAGNOSIS — D63.8: ICD-10-CM

## 2023-02-04 DIAGNOSIS — N18.9: ICD-10-CM

## 2023-02-04 DIAGNOSIS — J98.11: ICD-10-CM

## 2023-02-04 DIAGNOSIS — E78.5: ICD-10-CM

## 2023-02-04 LAB
BASOPHILS # BLD AUTO: 0 K/UL (ref 0–0.2)
BASOPHILS NFR BLD AUTO: 0.1 % (ref 0–2)
BUN SERPL-MCNC: 70 MG/DL (ref 7–18)
CALCIUM SERPL-MCNC: 8.6 MG/DL (ref 8.5–10.1)
CHLORIDE SERPL-SCNC: 102 MMOL/L (ref 98–107)
CO2 SERPL-SCNC: 25 MMOL/L (ref 21–32)
CREAT SERPL-MCNC: 4.5 MG/DL (ref 0.6–1.3)
EOSINOPHIL NFR BLD AUTO: 1.9 % (ref 0–6)
GLUCOSE SERPL-MCNC: 62 MG/DL (ref 74–106)
HCT VFR BLD AUTO: 25 % (ref 39–51)
HGB BLD-MCNC: 8 G/DL (ref 13.5–17.5)
LYMPHOCYTES NFR BLD AUTO: 0.8 K/UL (ref 0.8–4.8)
LYMPHOCYTES NFR BLD AUTO: 5.8 % (ref 20–44)
MCHC RBC AUTO-ENTMCNC: 32 G/DL (ref 31–36)
MCV RBC AUTO: 81 FL (ref 80–96)
MONOCYTES NFR BLD AUTO: 0.8 K/UL (ref 0.1–1.3)
MONOCYTES NFR BLD AUTO: 5.8 % (ref 2–12)
NEUTROPHILS # BLD AUTO: 11.6 K/UL (ref 1.8–8.9)
NEUTROPHILS NFR BLD AUTO: 86.4 % (ref 43–81)
PLATELET # BLD AUTO: 387 K/UL (ref 150–450)
POTASSIUM SERPL-SCNC: 5.2 MMOL/L (ref 3.5–5.1)
RBC # BLD AUTO: 3.15 MIL/UL (ref 4.5–6)
SODIUM SERPL-SCNC: 136 MMOL/L (ref 136–145)
WBC NRBC COR # BLD AUTO: 13.5 K/UL (ref 4.3–11)

## 2023-02-04 PROCEDURE — C1894 INTRO/SHEATH, NON-LASER: HCPCS

## 2023-02-04 PROCEDURE — G0378 HOSPITAL OBSERVATION PER HR: HCPCS

## 2023-02-04 PROCEDURE — C1750 CATH, HEMODIALYSIS,LONG-TERM: HCPCS

## 2023-02-04 PROCEDURE — P9016 RBC LEUKOCYTES REDUCED: HCPCS

## 2023-02-05 VITALS — SYSTOLIC BLOOD PRESSURE: 151 MMHG | DIASTOLIC BLOOD PRESSURE: 88 MMHG

## 2023-02-05 VITALS — SYSTOLIC BLOOD PRESSURE: 140 MMHG | DIASTOLIC BLOOD PRESSURE: 75 MMHG

## 2023-02-05 VITALS — DIASTOLIC BLOOD PRESSURE: 94 MMHG | SYSTOLIC BLOOD PRESSURE: 168 MMHG

## 2023-02-05 VITALS — DIASTOLIC BLOOD PRESSURE: 88 MMHG | SYSTOLIC BLOOD PRESSURE: 145 MMHG

## 2023-02-05 VITALS — SYSTOLIC BLOOD PRESSURE: 157 MMHG | DIASTOLIC BLOOD PRESSURE: 88 MMHG

## 2023-02-05 LAB
BUN SERPL-MCNC: 65 MG/DL (ref 7–18)
CALCIUM SERPL-MCNC: 8.2 MG/DL (ref 8.5–10.1)
CHLORIDE SERPL-SCNC: 103 MMOL/L (ref 98–107)
CO2 SERPL-SCNC: 25 MMOL/L (ref 21–32)
CREAT SERPL-MCNC: 4.5 MG/DL (ref 0.6–1.3)
GLUCOSE SERPL-MCNC: 154 MG/DL (ref 74–106)
POTASSIUM SERPL-SCNC: 5.1 MMOL/L (ref 3.5–5.1)
SODIUM SERPL-SCNC: 136 MMOL/L (ref 136–145)

## 2023-02-05 RX ADMIN — METOPROLOL SUCCINATE SCH MG: 50 TABLET, EXTENDED RELEASE ORAL at 08:06

## 2023-02-05 RX ADMIN — DEXTROSE MONOHYDRATE SCH MLS/HR: 50 INJECTION, SOLUTION INTRAVENOUS at 21:38

## 2023-02-05 RX ADMIN — Medication SCH EACH: at 21:27

## 2023-02-05 RX ADMIN — INSULIN HUMAN PRN UNITS: 100 INJECTION, SOLUTION PARENTERAL at 12:56

## 2023-02-05 RX ADMIN — PREGABALIN SCH MG: 100 CAPSULE ORAL at 12:33

## 2023-02-05 RX ADMIN — CYCLOBENZAPRINE HYDROCHLORIDE SCH MG: 10 TABLET, FILM COATED ORAL at 12:33

## 2023-02-05 RX ADMIN — Medication SCH EACH: at 06:27

## 2023-02-05 RX ADMIN — Medication SCH EACH: at 03:25

## 2023-02-05 RX ADMIN — ACETAMINOPHEN PRN MG: 325 TABLET ORAL at 02:44

## 2023-02-05 RX ADMIN — DEXTROSE MONOHYDRATE SCH MLS/HR: 50 INJECTION, SOLUTION INTRAVENOUS at 05:45

## 2023-02-05 RX ADMIN — OXYCODONE HYDROCHLORIDE AND ACETAMINOPHEN PRN UDTAB: 5; 325 TABLET ORAL at 05:18

## 2023-02-05 RX ADMIN — INSULIN HUMAN PRN UNITS: 100 INJECTION, SOLUTION PARENTERAL at 03:26

## 2023-02-05 RX ADMIN — PREGABALIN SCH MG: 100 CAPSULE ORAL at 16:27

## 2023-02-05 RX ADMIN — INSULIN HUMAN PRN UNITS: 100 INJECTION, SOLUTION PARENTERAL at 06:27

## 2023-02-05 RX ADMIN — CYCLOBENZAPRINE HYDROCHLORIDE SCH MG: 10 TABLET, FILM COATED ORAL at 21:26

## 2023-02-05 RX ADMIN — OXYCODONE HYDROCHLORIDE AND ACETAMINOPHEN PRN UDTAB: 5; 325 TABLET ORAL at 15:24

## 2023-02-05 RX ADMIN — Medication SCH EACH: at 12:33

## 2023-02-05 RX ADMIN — CYCLOBENZAPRINE HYDROCHLORIDE SCH MG: 10 TABLET, FILM COATED ORAL at 16:27

## 2023-02-05 RX ADMIN — CYCLOBENZAPRINE HYDROCHLORIDE SCH MG: 10 TABLET, FILM COATED ORAL at 08:05

## 2023-02-05 RX ADMIN — Medication SCH EACH: at 16:28

## 2023-02-05 RX ADMIN — ATORVASTATIN CALCIUM SCH MG: 40 TABLET, FILM COATED ORAL at 21:26

## 2023-02-05 RX ADMIN — INSULIN HUMAN PRN UNITS: 100 INJECTION, SOLUTION PARENTERAL at 21:39

## 2023-02-05 RX ADMIN — DEXTROSE MONOHYDRATE SCH MLS/HR: 50 INJECTION, SOLUTION INTRAVENOUS at 04:54

## 2023-02-05 RX ADMIN — ASPIRIN 81 MG SCH MG: 81 TABLET ORAL at 08:05

## 2023-02-05 RX ADMIN — DEXTROSE MONOHYDRATE SCH MLS/HR: 50 INJECTION, SOLUTION INTRAVENOUS at 21:26

## 2023-02-05 RX ADMIN — PREGABALIN SCH MG: 100 CAPSULE ORAL at 08:06

## 2023-02-05 RX ADMIN — Medication SCH EACH: at 08:48

## 2023-02-05 NOTE — NUR
RN Intervention

Patient not urinating, gave him time to try. Per blader scan >400ml. In & Out cath, 520ml 
collected.

## 2023-02-05 NOTE — NUR
TELE RN NOTE

PT IN BED AWAKE. A/O X 3, NO SOB, NO DISTRESS OR DISCOMFORT NOTED. DENIES PAIN. ON O2 2L VIA 
N/C O2 SAT 96%. ON TELE SR HR 98. IVF D 5 1/2 NS INFUSING  ML/HR, NO S/S OF 
INFILTRATION NOTED. KEPT HIM DRY AND CLEAN. ALL NEEDS ATTENDED. VSS. CONTINUE TO MONITOR 
HIM.

## 2023-02-05 NOTE — NUR
RN Closing Note

Patient AOx2-3. No signs of distress, complains of back pain. Medication administered as 
ordered by MD. 

Patients total urine output 2320ml. Educated patient on importance of using urinal and not 
holding urine.

Sister and nephew were at bedside earlier. 

All safety precautions taken, call light and table within reach, bed at lowest position.

## 2023-02-05 NOTE — NUR
RN NOTES



PT C/O PAIN 8/10 PER PS, PT WITH PMHX OF CHRONIC BACK PAIN. PRN MEDICATION GIVEN PER PT'S 
REQUEST AS ORDERED. WILL REASSES PT

## 2023-02-05 NOTE — NUR
RN NOTES



ADMITTED 48YO MALE FROM ER, ARRIVED VIA STRETCHER ACCOMPANIED BY 2 ED PERSONNELS. PT AOX3, 
VERBALLY RESPONSIVE. ON RA, O2 SAT 93%, NO ACUTE RESP DISTRESS NOTED. AFEBRILE. DENIES 
PAIN/DISCOMFORT AT THIS TIME. IV ACCESS ON R HAND #18G, CLEAN, DRY AND PATENT. VSS. PT ABLE 
TO AMBULATE WITH ASSIST. SKIN ASSESSMENT DONE, BLE EDEMA +4 NOTED. FALL AND SAFETY 
PRECAUTION OBSERVED: BED LOCKED AND IN LOWEST POSITION, CALL LIGHT WITHIN EASY REACH.

## 2023-02-06 VITALS — SYSTOLIC BLOOD PRESSURE: 154 MMHG | DIASTOLIC BLOOD PRESSURE: 88 MMHG

## 2023-02-06 VITALS — DIASTOLIC BLOOD PRESSURE: 86 MMHG | SYSTOLIC BLOOD PRESSURE: 148 MMHG

## 2023-02-06 VITALS — SYSTOLIC BLOOD PRESSURE: 159 MMHG | DIASTOLIC BLOOD PRESSURE: 82 MMHG

## 2023-02-06 VITALS — SYSTOLIC BLOOD PRESSURE: 163 MMHG | DIASTOLIC BLOOD PRESSURE: 87 MMHG

## 2023-02-06 VITALS — SYSTOLIC BLOOD PRESSURE: 151 MMHG | DIASTOLIC BLOOD PRESSURE: 78 MMHG

## 2023-02-06 LAB
ALBUMIN SERPL BCP-MCNC: 1.4 G/DL (ref 3.4–5)
ALP SERPL-CCNC: 141 U/L (ref 46–116)
ALT SERPL W P-5'-P-CCNC: 27 U/L (ref 12–78)
AST SERPL W P-5'-P-CCNC: 29 U/L (ref 15–37)
BASOPHILS # BLD AUTO: 0.1 K/UL (ref 0–0.2)
BASOPHILS NFR BLD AUTO: 0.6 % (ref 0–2)
BILIRUB SERPL-MCNC: 0.1 MG/DL (ref 0.2–1)
BUN SERPL-MCNC: 70 MG/DL (ref 7–18)
CALCIUM SERPL-MCNC: 8.3 MG/DL (ref 8.5–10.1)
CHLORIDE SERPL-SCNC: 102 MMOL/L (ref 98–107)
CO2 SERPL-SCNC: 26 MMOL/L (ref 21–32)
CREAT SERPL-MCNC: 4.8 MG/DL (ref 0.6–1.3)
EOSINOPHIL NFR BLD AUTO: 6.5 % (ref 0–6)
GLUCOSE SERPL-MCNC: 119 MG/DL (ref 74–106)
HCT VFR BLD AUTO: 23 % (ref 39–51)
HGB BLD-MCNC: 7.6 G/DL (ref 13.5–17.5)
LYMPHOCYTES NFR BLD AUTO: 1.4 K/UL (ref 0.8–4.8)
LYMPHOCYTES NFR BLD AUTO: 14.7 % (ref 20–44)
MAGNESIUM SERPL-MCNC: 1.7 MG/DL (ref 1.8–2.4)
MCHC RBC AUTO-ENTMCNC: 32 G/DL (ref 31–36)
MCV RBC AUTO: 82 FL (ref 80–96)
MONOCYTES NFR BLD AUTO: 0.7 K/UL (ref 0.1–1.3)
MONOCYTES NFR BLD AUTO: 7.4 % (ref 2–12)
NEUTROPHILS # BLD AUTO: 6.9 K/UL (ref 1.8–8.9)
NEUTROPHILS NFR BLD AUTO: 70.8 % (ref 43–81)
PHOSPHATE SERPL-MCNC: 5.9 MG/DL (ref 2.5–4.9)
PLATELET # BLD AUTO: 351 K/UL (ref 150–450)
POTASSIUM SERPL-SCNC: 5 MMOL/L (ref 3.5–5.1)
PROT SERPL-MCNC: 6.4 G/DL (ref 6.4–8.2)
RBC # BLD AUTO: 2.84 MIL/UL (ref 4.5–6)
SODIUM SERPL-SCNC: 136 MMOL/L (ref 136–145)
WBC NRBC COR # BLD AUTO: 9.8 K/UL (ref 4.3–11)

## 2023-02-06 PROCEDURE — 05HB33Z INSERTION OF INFUSION DEVICE INTO RIGHT BASILIC VEIN, PERCUTANEOUS APPROACH: ICD-10-PCS | Performed by: NURSE PRACTITIONER

## 2023-02-06 RX ADMIN — PREGABALIN SCH MG: 100 CAPSULE ORAL at 12:14

## 2023-02-06 RX ADMIN — Medication SCH EACH: at 05:52

## 2023-02-06 RX ADMIN — Medication SCH EACH: at 00:59

## 2023-02-06 RX ADMIN — INSULIN HUMAN PRN UNITS: 100 INJECTION, SOLUTION PARENTERAL at 21:56

## 2023-02-06 RX ADMIN — INSULIN HUMAN PRN UNITS: 100 INJECTION, SOLUTION PARENTERAL at 12:18

## 2023-02-06 RX ADMIN — Medication SCH EACH: at 12:15

## 2023-02-06 RX ADMIN — Medication SCH EACH: at 09:24

## 2023-02-06 RX ADMIN — DEXTROSE MONOHYDRATE SCH MLS/HR: 50 INJECTION, SOLUTION INTRAVENOUS at 21:54

## 2023-02-06 RX ADMIN — CYCLOBENZAPRINE HYDROCHLORIDE SCH MG: 10 TABLET, FILM COATED ORAL at 21:20

## 2023-02-06 RX ADMIN — ASPIRIN 81 MG SCH MG: 81 TABLET ORAL at 09:23

## 2023-02-06 RX ADMIN — PREGABALIN SCH MG: 100 CAPSULE ORAL at 17:06

## 2023-02-06 RX ADMIN — METOPROLOL SUCCINATE SCH MG: 50 TABLET, EXTENDED RELEASE ORAL at 09:24

## 2023-02-06 RX ADMIN — ATORVASTATIN CALCIUM SCH MG: 40 TABLET, FILM COATED ORAL at 21:20

## 2023-02-06 RX ADMIN — Medication SCH EACH: at 21:53

## 2023-02-06 RX ADMIN — CYCLOBENZAPRINE HYDROCHLORIDE SCH MG: 10 TABLET, FILM COATED ORAL at 17:06

## 2023-02-06 RX ADMIN — CYCLOBENZAPRINE HYDROCHLORIDE SCH MG: 10 TABLET, FILM COATED ORAL at 12:15

## 2023-02-06 RX ADMIN — INSULIN HUMAN PRN UNITS: 100 INJECTION, SOLUTION PARENTERAL at 01:02

## 2023-02-06 RX ADMIN — DEXTROSE MONOHYDRATE SCH MLS/HR: 50 INJECTION, SOLUTION INTRAVENOUS at 21:20

## 2023-02-06 RX ADMIN — Medication SCH EACH: at 17:06

## 2023-02-06 RX ADMIN — CYCLOBENZAPRINE HYDROCHLORIDE SCH MG: 10 TABLET, FILM COATED ORAL at 09:23

## 2023-02-06 RX ADMIN — INSULIN HUMAN PRN UNITS: 100 INJECTION, SOLUTION PARENTERAL at 09:25

## 2023-02-06 RX ADMIN — Medication PRN MG: at 12:15

## 2023-02-06 RX ADMIN — Medication PRN MG: at 21:20

## 2023-02-06 RX ADMIN — PREGABALIN SCH MG: 100 CAPSULE ORAL at 09:24

## 2023-02-06 RX ADMIN — INSULIN HUMAN PRN UNITS: 100 INJECTION, SOLUTION PARENTERAL at 18:17

## 2023-02-06 NOTE — NUR
TELE RN NOTE

PT IN BED ASLEEP, EASILY AROUSABLE. STILL REFUSING IV INSERTION AND O2 N/C. EASILY GETTING 
ANGRY. WILL ENDORSE TO DAY SHIFT NURSE FOR CONTINUE TO CARE.

## 2023-02-06 NOTE — NUR
TELE RN OPENING NOTE



RECEIVED PT IN BED AWAKE, WITH SISTER AT BED SIDE. PT A/O X 3, NO SOB, NO DISTRESS OR 
DISCOMFORT NOTED. DENIES PAIN AT THIS TIME. ON O2 2L VIA N/C O2, WITH SAT AT 99%. ON TELE 
MONITOR READING SR  WITH HR 9O. MIDLINE TO RIGHT UA WITH IVF D 5 1/2 NS INFUSING  
ML/HR, NO S/S OF INFILTRATION NOTED. SAFETY MEASURES IN PLACE: BED LOCKED IN LOW POSITION, 
SIDE RAILS UP X 2, CALL LIGHT WITHIN REACH. WILL CONTINUE TO MONITOR PT.

## 2023-02-06 NOTE — NUR
TELE RN NOTE

PT WOKE UP AND PULLED IV LINE, MINOR BLOOD NOTED. PT IS AGITATED AND STATES "I WANT TO  GO 
HOME NOW". PT DON'T WANT TO LISTEN ANYTHING. REMOVED O2 N/C ALSO. CALLED DTR MAXINE AT HOME 
AND SHE TALKED WITH THE PT. AFTER CONVERSATION WITH THE DTR PT STATES " I WILL STAY TILL 
MORNING AND THEN GO HOME". PT REFUSING IV LINE INSERTION AND O2 N/C. 1;1 TIME SPENT. PT WANT 
ME TO LEAVE THE ROOM. WILL CONTINUE TO MONITOR HIM.

## 2023-02-06 NOTE — NUR
WOUND CARE CONSULT: LIMITED ASSESSMENT DUE TO PT REFUSAL TO TURN FOR FULL SKIN ASSESSMENT. 
PT NOTED TO HAVE DRY ABRASIONS/SCABS TO ARMS AND LEGS, PRESENT ON ADMISSION. DISCUSSED SKIN 
PROTECTION WITH NURSING STAFF. MD IN AGREEMENT WITH PLAN OF CARE. WILL SEE PRN.

## 2023-02-06 NOTE — NUR
CHANGE OF SHIFT REPORT



PT RESTING COMFORTABLY IN BED. NO S/S OR C/O PAIN OR DISTRESS NOTED. SIDERAILS UP X2, CALL 
LIGHT LEFT WITHIN REACH. PT KEPT CLEAN, DRY, AND COMFORTABLE. NO SIGNIFICANT CHANGES SINCE 
PREVIOUS SHIFT. WILL GIVE REPORT TO NOC RN.

## 2023-02-06 NOTE — NUR
TELE RN OPENING NOTES



Patient sleeping but easily aroused, AOx4, able to express his own concerns. No signs of 
distress or discomfort. On RA, tolerating well, no sob noted, respiration even and 
unlabored. On tele monitor with reading SR HR 61. No s/sx of hypoglycemia. Safety measures 
in placed. Call light within reach. Plan of care continue.

## 2023-02-07 VITALS — DIASTOLIC BLOOD PRESSURE: 88 MMHG | SYSTOLIC BLOOD PRESSURE: 166 MMHG

## 2023-02-07 VITALS — SYSTOLIC BLOOD PRESSURE: 148 MMHG | DIASTOLIC BLOOD PRESSURE: 85 MMHG

## 2023-02-07 VITALS — DIASTOLIC BLOOD PRESSURE: 84 MMHG | SYSTOLIC BLOOD PRESSURE: 147 MMHG

## 2023-02-07 VITALS — SYSTOLIC BLOOD PRESSURE: 146 MMHG | DIASTOLIC BLOOD PRESSURE: 83 MMHG

## 2023-02-07 VITALS — DIASTOLIC BLOOD PRESSURE: 89 MMHG | SYSTOLIC BLOOD PRESSURE: 162 MMHG

## 2023-02-07 VITALS — SYSTOLIC BLOOD PRESSURE: 153 MMHG | DIASTOLIC BLOOD PRESSURE: 88 MMHG

## 2023-02-07 LAB
BASOPHILS # BLD AUTO: 0 K/UL (ref 0–0.2)
BASOPHILS NFR BLD AUTO: 0.3 % (ref 0–2)
BUN SERPL-MCNC: 69 MG/DL (ref 7–18)
BUN SERPL-MCNC: 70 MG/DL (ref 7–18)
CALCIUM SERPL-MCNC: 8.4 MG/DL (ref 8.5–10.1)
CALCIUM SERPL-MCNC: 8.5 MG/DL (ref 8.5–10.1)
CHLORIDE SERPL-SCNC: 104 MMOL/L (ref 98–107)
CHLORIDE SERPL-SCNC: 105 MMOL/L (ref 98–107)
CO2 SERPL-SCNC: 26 MMOL/L (ref 21–32)
CO2 SERPL-SCNC: 27 MMOL/L (ref 21–32)
CREAT SERPL-MCNC: 4.4 MG/DL (ref 0.6–1.3)
CREAT SERPL-MCNC: 4.5 MG/DL (ref 0.6–1.3)
EOSINOPHIL NFR BLD AUTO: 6.3 % (ref 0–6)
GLUCOSE SERPL-MCNC: 110 MG/DL (ref 74–106)
GLUCOSE SERPL-MCNC: 111 MG/DL (ref 74–106)
HCT VFR BLD AUTO: 22 % (ref 39–51)
HGB BLD-MCNC: 7 G/DL (ref 13.5–17.5)
IRON SERPL-MCNC: 10 UG/DL (ref 50–175)
LYMPHOCYTES NFR BLD AUTO: 1.5 K/UL (ref 0.8–4.8)
LYMPHOCYTES NFR BLD AUTO: 13.1 % (ref 20–44)
MCHC RBC AUTO-ENTMCNC: 32 G/DL (ref 31–36)
MCV RBC AUTO: 83 FL (ref 80–96)
MONOCYTES NFR BLD AUTO: 0.8 K/UL (ref 0.1–1.3)
MONOCYTES NFR BLD AUTO: 7.1 % (ref 2–12)
NEUTROPHILS # BLD AUTO: 8.5 K/UL (ref 1.8–8.9)
NEUTROPHILS NFR BLD AUTO: 73.2 % (ref 43–81)
PLATELET # BLD AUTO: 351 K/UL (ref 150–450)
POTASSIUM SERPL-SCNC: 4.8 MMOL/L (ref 3.5–5.1)
POTASSIUM SERPL-SCNC: 4.9 MMOL/L (ref 3.5–5.1)
RBC # BLD AUTO: 2.68 MIL/UL (ref 4.5–6)
SODIUM SERPL-SCNC: 137 MMOL/L (ref 136–145)
SODIUM SERPL-SCNC: 140 MMOL/L (ref 136–145)
TIBC SERPL-MCNC: 128 UG/DL (ref 250–450)
WBC NRBC COR # BLD AUTO: 11.7 K/UL (ref 4.3–11)

## 2023-02-07 PROCEDURE — 05HM33Z INSERTION OF INFUSION DEVICE INTO RIGHT INTERNAL JUGULAR VEIN, PERCUTANEOUS APPROACH: ICD-10-PCS | Performed by: SURGERY

## 2023-02-07 PROCEDURE — 5A1D70Z PERFORMANCE OF URINARY FILTRATION, INTERMITTENT, LESS THAN 6 HOURS PER DAY: ICD-10-PCS

## 2023-02-07 PROCEDURE — 30233N1 TRANSFUSION OF NONAUTOLOGOUS RED BLOOD CELLS INTO PERIPHERAL VEIN, PERCUTANEOUS APPROACH: ICD-10-PCS | Performed by: NURSE PRACTITIONER

## 2023-02-07 PROCEDURE — B513YZA FLUOROSCOPY OF RIGHT JUGULAR VEINS USING OTHER CONTRAST, GUIDANCE: ICD-10-PCS | Performed by: SURGERY

## 2023-02-07 PROCEDURE — 0JH63XZ INSERTION OF TUNNELED VASCULAR ACCESS DEVICE INTO CHEST SUBCUTANEOUS TISSUE AND FASCIA, PERCUTANEOUS APPROACH: ICD-10-PCS | Performed by: SURGERY

## 2023-02-07 RX ADMIN — DEXTROSE MONOHYDRATE SCH MLS/HR: 50 INJECTION, SOLUTION INTRAVENOUS at 21:34

## 2023-02-07 RX ADMIN — METOPROLOL SUCCINATE SCH MG: 50 TABLET, EXTENDED RELEASE ORAL at 09:10

## 2023-02-07 RX ADMIN — Medication SCH EACH: at 09:00

## 2023-02-07 RX ADMIN — CYCLOBENZAPRINE HYDROCHLORIDE SCH MG: 10 TABLET, FILM COATED ORAL at 16:36

## 2023-02-07 RX ADMIN — INSULIN HUMAN PRN UNITS: 100 INJECTION, SOLUTION PARENTERAL at 12:20

## 2023-02-07 RX ADMIN — CYCLOBENZAPRINE HYDROCHLORIDE SCH MG: 10 TABLET, FILM COATED ORAL at 09:11

## 2023-02-07 RX ADMIN — Medication PRN MG: at 10:45

## 2023-02-07 RX ADMIN — Medication SCH EACH: at 16:34

## 2023-02-07 RX ADMIN — Medication SCH EACH: at 01:16

## 2023-02-07 RX ADMIN — DEXTROSE MONOHYDRATE SCH MLS/HR: 50 INJECTION, SOLUTION INTRAVENOUS at 21:27

## 2023-02-07 RX ADMIN — Medication SCH EACH: at 12:18

## 2023-02-07 RX ADMIN — CYCLOBENZAPRINE HYDROCHLORIDE SCH MG: 10 TABLET, FILM COATED ORAL at 13:06

## 2023-02-07 RX ADMIN — CYCLOBENZAPRINE HYDROCHLORIDE SCH MG: 10 TABLET, FILM COATED ORAL at 21:21

## 2023-02-07 RX ADMIN — PREGABALIN SCH MG: 100 CAPSULE ORAL at 13:06

## 2023-02-07 RX ADMIN — ASPIRIN 81 MG SCH MG: 81 TABLET ORAL at 09:10

## 2023-02-07 RX ADMIN — PREGABALIN SCH MG: 100 CAPSULE ORAL at 16:36

## 2023-02-07 RX ADMIN — Medication SCH EACH: at 05:00

## 2023-02-07 RX ADMIN — HEPARIN SODIUM SCH UNITS: 5000 INJECTION INTRAVENOUS; SUBCUTANEOUS at 21:22

## 2023-02-07 RX ADMIN — Medication SCH EACH: at 21:50

## 2023-02-07 RX ADMIN — INSULIN HUMAN PRN UNITS: 100 INJECTION, SOLUTION PARENTERAL at 21:32

## 2023-02-07 RX ADMIN — PREGABALIN SCH MG: 100 CAPSULE ORAL at 09:11

## 2023-02-07 RX ADMIN — ATORVASTATIN CALCIUM SCH MG: 40 TABLET, FILM COATED ORAL at 21:21

## 2023-02-07 NOTE — NUR
LVN CLOSING NOTES



All due meds and tx given as ordered. Pt tolerated everything well. All needs attended to. 
Pt is currently on 3L NC and tolerating it well. IV access on CHAKA midline patent and intact. 
HOB elevated to pts comfort. Siderails up at all times x2. Bed at its lowest setting. Call 
light within reach. Will endorse to oncoming nurse.

## 2023-02-07 NOTE — NUR
TELE RN CLOSING NOTE

PT LEFT VIA GURNEY TO OR FOR PERMA CATH PLACEMENT WITH OR NURSE.  ALL CONSENT SIGNED, AND 
CHECK LIST COMPLETED, AND PLACED IN CHART. PT IN STABLE CONDITION, NO SOB, NO RESPIRATORY 
DISTRESS NOTED. WILL ENDORSE PT TO AM SHIFT NURSE FOR PRATEEK.

## 2023-02-07 NOTE — NUR
LVN NOTES



Dr. Silver seen and evaluated pt and orered a iron panel and give 1 unit packed rbc with 
HD today.

## 2023-02-07 NOTE — NUR
RN OPENING NOTES;



Received pt in bed sleeping but easy to aroused,on 3l o2 via nc tenisha,well satting 98%,no sign 
Sob/Distress noted, IV access on CHAKA midline patent and intact.safety measure in place,Call 
light within reach. Will continue to monitor.

## 2023-02-07 NOTE — NUR
LVN NOTES

 

Pt came back from PermCath procedure and it is on the RUC. Tolerated the procedure well. MD 
OR nurse stated MD said line is ok to use. MD resumed all diet orders. Pt is currently on 3L 
NC. Will continue to monitor.

## 2023-02-08 VITALS — SYSTOLIC BLOOD PRESSURE: 184 MMHG | DIASTOLIC BLOOD PRESSURE: 95 MMHG

## 2023-02-08 VITALS — DIASTOLIC BLOOD PRESSURE: 88 MMHG | SYSTOLIC BLOOD PRESSURE: 198 MMHG

## 2023-02-08 VITALS — SYSTOLIC BLOOD PRESSURE: 167 MMHG | DIASTOLIC BLOOD PRESSURE: 92 MMHG

## 2023-02-08 VITALS — DIASTOLIC BLOOD PRESSURE: 93 MMHG | SYSTOLIC BLOOD PRESSURE: 178 MMHG

## 2023-02-08 VITALS — DIASTOLIC BLOOD PRESSURE: 94 MMHG | SYSTOLIC BLOOD PRESSURE: 166 MMHG

## 2023-02-08 VITALS — DIASTOLIC BLOOD PRESSURE: 98 MMHG | SYSTOLIC BLOOD PRESSURE: 167 MMHG

## 2023-02-08 LAB
BASOPHILS # BLD AUTO: 0.1 K/UL (ref 0–0.2)
BASOPHILS NFR BLD AUTO: 0.6 % (ref 0–2)
BUN SERPL-MCNC: 55 MG/DL (ref 7–18)
CALCIUM SERPL-MCNC: 8.6 MG/DL (ref 8.5–10.1)
CHLORIDE SERPL-SCNC: 104 MMOL/L (ref 98–107)
CO2 SERPL-SCNC: 29 MMOL/L (ref 21–32)
CREAT SERPL-MCNC: 3.8 MG/DL (ref 0.6–1.3)
EOSINOPHIL NFR BLD AUTO: 7.8 % (ref 0–6)
GLUCOSE SERPL-MCNC: 144 MG/DL (ref 74–106)
HCT VFR BLD AUTO: 24 % (ref 39–51)
HGB BLD-MCNC: 8 G/DL (ref 13.5–17.5)
LYMPHOCYTES NFR BLD AUTO: 1.3 K/UL (ref 0.8–4.8)
LYMPHOCYTES NFR BLD AUTO: 16.6 % (ref 20–44)
MAGNESIUM SERPL-MCNC: 1.8 MG/DL (ref 1.8–2.4)
MCHC RBC AUTO-ENTMCNC: 33 G/DL (ref 31–36)
MCV RBC AUTO: 82 FL (ref 80–96)
MONOCYTES NFR BLD AUTO: 0.6 K/UL (ref 0.1–1.3)
MONOCYTES NFR BLD AUTO: 7.3 % (ref 2–12)
NEUTROPHILS # BLD AUTO: 5.3 K/UL (ref 1.8–8.9)
NEUTROPHILS NFR BLD AUTO: 67.7 % (ref 43–81)
PHOSPHATE SERPL-MCNC: 5.1 MG/DL (ref 2.5–4.9)
PLATELET # BLD AUTO: 323 K/UL (ref 150–450)
POTASSIUM SERPL-SCNC: 4.7 MMOL/L (ref 3.5–5.1)
RBC # BLD AUTO: 2.93 MIL/UL (ref 4.5–6)
SODIUM SERPL-SCNC: 141 MMOL/L (ref 136–145)
WBC NRBC COR # BLD AUTO: 7.9 K/UL (ref 4.3–11)

## 2023-02-08 RX ADMIN — ATORVASTATIN CALCIUM SCH MG: 40 TABLET, FILM COATED ORAL at 21:04

## 2023-02-08 RX ADMIN — Medication SCH EACH: at 05:09

## 2023-02-08 RX ADMIN — METOPROLOL SUCCINATE SCH MG: 50 TABLET, EXTENDED RELEASE ORAL at 08:08

## 2023-02-08 RX ADMIN — Medication SCH EACH: at 08:14

## 2023-02-08 RX ADMIN — Medication SCH EACH: at 12:17

## 2023-02-08 RX ADMIN — DEXTROSE MONOHYDRATE SCH MLS/HR: 50 INJECTION, SOLUTION INTRAVENOUS at 21:08

## 2023-02-08 RX ADMIN — DEXTROSE MONOHYDRATE SCH MLS/HR: 50 INJECTION, SOLUTION INTRAVENOUS at 20:26

## 2023-02-08 RX ADMIN — CYCLOBENZAPRINE HYDROCHLORIDE SCH MG: 10 TABLET, FILM COATED ORAL at 20:33

## 2023-02-08 RX ADMIN — EPOETIN ALFA SCH UNIT: 4000 SOLUTION INTRAVENOUS; SUBCUTANEOUS at 15:00

## 2023-02-08 RX ADMIN — CYCLOBENZAPRINE HYDROCHLORIDE SCH MG: 10 TABLET, FILM COATED ORAL at 08:07

## 2023-02-08 RX ADMIN — Medication SCH EACH: at 16:28

## 2023-02-08 RX ADMIN — PREGABALIN SCH MG: 100 CAPSULE ORAL at 12:04

## 2023-02-08 RX ADMIN — PREGABALIN SCH MG: 100 CAPSULE ORAL at 08:06

## 2023-02-08 RX ADMIN — CYCLOBENZAPRINE HYDROCHLORIDE SCH MG: 10 TABLET, FILM COATED ORAL at 12:05

## 2023-02-08 RX ADMIN — INSULIN HUMAN PRN UNITS: 100 INJECTION, SOLUTION PARENTERAL at 20:57

## 2023-02-08 RX ADMIN — Medication SCH EACH: at 00:40

## 2023-02-08 RX ADMIN — HEPARIN SODIUM SCH UNITS: 5000 INJECTION INTRAVENOUS; SUBCUTANEOUS at 20:57

## 2023-02-08 RX ADMIN — ASPIRIN 81 MG SCH MG: 81 TABLET ORAL at 08:06

## 2023-02-08 RX ADMIN — Medication SCH EACH: at 20:54

## 2023-02-08 RX ADMIN — INSULIN HUMAN PRN UNITS: 100 INJECTION, SOLUTION PARENTERAL at 12:19

## 2023-02-08 RX ADMIN — CYCLOBENZAPRINE HYDROCHLORIDE SCH MG: 10 TABLET, FILM COATED ORAL at 16:23

## 2023-02-08 RX ADMIN — PREGABALIN SCH MG: 100 CAPSULE ORAL at 16:23

## 2023-02-08 RX ADMIN — Medication PRN MG: at 19:54

## 2023-02-08 RX ADMIN — INSULIN HUMAN PRN UNITS: 100 INJECTION, SOLUTION PARENTERAL at 05:08

## 2023-02-08 RX ADMIN — HEPARIN SODIUM SCH UNITS: 5000 INJECTION INTRAVENOUS; SUBCUTANEOUS at 08:10

## 2023-02-08 NOTE — NUR
TELE RN OPENING NOTES - RECEIVED PATIENT AWAKE, HOB IN HIGH OLSON'S. SISTER ON BEDSIDE. A/O 
X3, WITH PERIODS OF CONFUSION AND IRRITABILITY. BREATHING EVEN AND NON-LABORED ON ROOM AIR. 
NOT IN APPARENT DISTRESS. C/O CHRONIC SHARP LOW BACK PAIN 10/10. ON TELE MONITOR READING 
SINUS RHYTHM AT 85 BPM. HAS RIGHT UPPER ARM MIDLINE #18G AND SALINE LOCKED. NO S/S OF 
INFILTRATION NOTED. SAFETY PRECAUTIONS IN PLACE: BED LOCKED AND IN LOW POSITION, SIDE RAILS 
UP X2, CALL LIGHT WITHIN REACH. WILL CONTINUE PLAN OF CARE.

## 2023-02-08 NOTE — NUR
CLOSING NOTES;



Patient in bed Aaox3 with confusion but able to make needs known,on 3l o2 via nc tenisha,well 
satting 96.6%,no sign Sob/Distress noted,no complained of pain/discomfort during shift,all 
needs attended,IV access on CHAKA midline patent and intact.Pt BRP Ambulatory with 
assest,safety measure in place,Call light within reach. Will Endorsed to next shift.

## 2023-02-08 NOTE — NUR
RN CLOSING NOTES;



Patient in bed Aaox3 with confusion but able to make needs known,on 3l o2 via nc tenisha,well 
satting 96.6%,no sign Sob/Distress noted,no complained of pain/discomfort during shift,due 
meds given as order,all needs attended,IV access on CHAKA midline patent and intact.Pt BRP 
Ambulatory with assest,safety measure in place,Call light within reach. Will Endorsed to 
next shift.

## 2023-02-09 VITALS — SYSTOLIC BLOOD PRESSURE: 186 MMHG | DIASTOLIC BLOOD PRESSURE: 84 MMHG

## 2023-02-09 VITALS — SYSTOLIC BLOOD PRESSURE: 167 MMHG | DIASTOLIC BLOOD PRESSURE: 94 MMHG

## 2023-02-09 VITALS — SYSTOLIC BLOOD PRESSURE: 140 MMHG | DIASTOLIC BLOOD PRESSURE: 65 MMHG

## 2023-02-09 VITALS — SYSTOLIC BLOOD PRESSURE: 161 MMHG | DIASTOLIC BLOOD PRESSURE: 92 MMHG

## 2023-02-09 VITALS — DIASTOLIC BLOOD PRESSURE: 87 MMHG | SYSTOLIC BLOOD PRESSURE: 173 MMHG

## 2023-02-09 VITALS — SYSTOLIC BLOOD PRESSURE: 171 MMHG | DIASTOLIC BLOOD PRESSURE: 69 MMHG

## 2023-02-09 LAB
BASOPHILS # BLD AUTO: 0 K/UL (ref 0–0.2)
BASOPHILS NFR BLD AUTO: 0.4 % (ref 0–2)
BUN SERPL-MCNC: 52 MG/DL (ref 7–18)
CALCIUM SERPL-MCNC: 8.7 MG/DL (ref 8.5–10.1)
CHLORIDE SERPL-SCNC: 107 MMOL/L (ref 98–107)
CO2 SERPL-SCNC: 27 MMOL/L (ref 21–32)
CREAT SERPL-MCNC: 3.8 MG/DL (ref 0.6–1.3)
EOSINOPHIL NFR BLD AUTO: 8.2 % (ref 0–6)
GLUCOSE SERPL-MCNC: 127 MG/DL (ref 74–106)
HCT VFR BLD AUTO: 25 % (ref 39–51)
HGB BLD-MCNC: 8 G/DL (ref 13.5–17.5)
LYMPHOCYTES NFR BLD AUTO: 1.2 K/UL (ref 0.8–4.8)
LYMPHOCYTES NFR BLD AUTO: 14 % (ref 20–44)
MAGNESIUM SERPL-MCNC: 1.8 MG/DL (ref 1.8–2.4)
MCHC RBC AUTO-ENTMCNC: 32 G/DL (ref 31–36)
MCV RBC AUTO: 81 FL (ref 80–96)
MONOCYTES NFR BLD AUTO: 0.6 K/UL (ref 0.1–1.3)
MONOCYTES NFR BLD AUTO: 6.6 % (ref 2–12)
NEUTROPHILS # BLD AUTO: 5.9 K/UL (ref 1.8–8.9)
NEUTROPHILS NFR BLD AUTO: 70.8 % (ref 43–81)
PHOSPHATE SERPL-MCNC: 4.6 MG/DL (ref 2.5–4.9)
PLATELET # BLD AUTO: 357 K/UL (ref 150–450)
POTASSIUM SERPL-SCNC: 4.1 MMOL/L (ref 3.5–5.1)
RBC # BLD AUTO: 3.1 MIL/UL (ref 4.5–6)
SODIUM SERPL-SCNC: 143 MMOL/L (ref 136–145)
WBC NRBC COR # BLD AUTO: 8.4 K/UL (ref 4.3–11)

## 2023-02-09 RX ADMIN — DEXTROSE MONOHYDRATE SCH MLS/HR: 50 INJECTION, SOLUTION INTRAVENOUS at 21:29

## 2023-02-09 RX ADMIN — Medication SCH EACH: at 04:43

## 2023-02-09 RX ADMIN — ATORVASTATIN CALCIUM SCH MG: 40 TABLET, FILM COATED ORAL at 21:30

## 2023-02-09 RX ADMIN — HEPARIN SODIUM SCH UNITS: 5000 INJECTION INTRAVENOUS; SUBCUTANEOUS at 21:31

## 2023-02-09 RX ADMIN — PREGABALIN SCH MG: 100 CAPSULE ORAL at 08:08

## 2023-02-09 RX ADMIN — INSULIN HUMAN PRN UNITS: 100 INJECTION, SOLUTION PARENTERAL at 17:19

## 2023-02-09 RX ADMIN — Medication SCH EACH: at 09:23

## 2023-02-09 RX ADMIN — CYCLOBENZAPRINE HYDROCHLORIDE SCH MG: 10 TABLET, FILM COATED ORAL at 16:15

## 2023-02-09 RX ADMIN — PREGABALIN SCH MG: 100 CAPSULE ORAL at 12:30

## 2023-02-09 RX ADMIN — INSULIN HUMAN PRN UNITS: 100 INJECTION, SOLUTION PARENTERAL at 04:44

## 2023-02-09 RX ADMIN — INSULIN HUMAN PRN UNITS: 100 INJECTION, SOLUTION PARENTERAL at 00:48

## 2023-02-09 RX ADMIN — METOPROLOL SUCCINATE SCH MG: 50 TABLET, EXTENDED RELEASE ORAL at 09:19

## 2023-02-09 RX ADMIN — FUROSEMIDE SCH MG: 10 INJECTION, SOLUTION INTRAMUSCULAR; INTRAVENOUS at 16:12

## 2023-02-09 RX ADMIN — SODIUM CHLORIDE SCH MLS/HR: 9 INJECTION, SOLUTION INTRAVENOUS at 13:16

## 2023-02-09 RX ADMIN — INSULIN HUMAN PRN UNITS: 100 INJECTION, SOLUTION PARENTERAL at 12:39

## 2023-02-09 RX ADMIN — CYCLOBENZAPRINE HYDROCHLORIDE SCH MG: 10 TABLET, FILM COATED ORAL at 12:30

## 2023-02-09 RX ADMIN — Medication SCH EACH: at 21:24

## 2023-02-09 RX ADMIN — ASPIRIN 81 MG SCH MG: 81 TABLET ORAL at 08:08

## 2023-02-09 RX ADMIN — CYCLOBENZAPRINE HYDROCHLORIDE SCH MG: 10 TABLET, FILM COATED ORAL at 21:30

## 2023-02-09 RX ADMIN — CYCLOBENZAPRINE HYDROCHLORIDE SCH MG: 10 TABLET, FILM COATED ORAL at 08:08

## 2023-02-09 RX ADMIN — PREGABALIN SCH MG: 100 CAPSULE ORAL at 16:15

## 2023-02-09 RX ADMIN — Medication SCH EACH: at 16:24

## 2023-02-09 RX ADMIN — Medication SCH EACH: at 12:38

## 2023-02-09 RX ADMIN — Medication SCH EACH: at 00:47

## 2023-02-09 RX ADMIN — HEPARIN SODIUM SCH UNITS: 5000 INJECTION INTRAVENOUS; SUBCUTANEOUS at 08:09

## 2023-02-09 NOTE — NUR
FOLLOW UP WITH HEMODIALYSIS NURSE REGARDING ETA HD TODAY, PATIENT WAS NOT DIALYZED YESTERDAY 
AND GOT BUMP OFF FOR TODAY. CHARGE GINNY ATKINSON.

## 2023-02-09 NOTE — NUR
TELE RN CLOSING NOTES - PATIENT SLEEPING INTERMITTENTLY. ABLE TO VERBALIZE NEEDS. NO ACUTE 
DISTRESS THROUGHOUT THE NIGHT. NO SOB OR  NOTED. NO C/O PAIN OR DISCOMFORT AT THIS TIME. 
AFEBRILE. ON TELE MONITOR READING SINUS RHYTHM WITH OCCASIONAL PVC AT 90 BPM. RIGHT UPPER 
ARM MIDLINE INTACT, PATENT AND FLUSHING. ALL DUE MEDS GIVEN AND NEEDS ATTENDED. SAFETY 
PRECAUTIONS MAINTAINED. WILL ENDORSE TO NEXT SHIFT FOR PRATEEK.

## 2023-02-09 NOTE — NUR
CLOSING NOTES;



Patient in bed Aaox3 with confusion but able to make needs known,on 3l o2 via nc tenisha,well 
satting 96.6%,no sign Sob/Distress noted,no complained of pain/discomfort during shift,all 
needs attended,IV access on CHAKA midline patent and intact.Pt BRP Ambulatory with 
assest,safety measure in place,Call light within reach. HD just started. Will Endorsed to 
next shift.

## 2023-02-10 VITALS — SYSTOLIC BLOOD PRESSURE: 158 MMHG | DIASTOLIC BLOOD PRESSURE: 87 MMHG

## 2023-02-10 VITALS — DIASTOLIC BLOOD PRESSURE: 76 MMHG | SYSTOLIC BLOOD PRESSURE: 144 MMHG

## 2023-02-10 VITALS — DIASTOLIC BLOOD PRESSURE: 86 MMHG | SYSTOLIC BLOOD PRESSURE: 144 MMHG

## 2023-02-10 VITALS — SYSTOLIC BLOOD PRESSURE: 153 MMHG | DIASTOLIC BLOOD PRESSURE: 79 MMHG

## 2023-02-10 VITALS — SYSTOLIC BLOOD PRESSURE: 156 MMHG | DIASTOLIC BLOOD PRESSURE: 88 MMHG

## 2023-02-10 VITALS — DIASTOLIC BLOOD PRESSURE: 96 MMHG | SYSTOLIC BLOOD PRESSURE: 159 MMHG

## 2023-02-10 RX ADMIN — INSULIN HUMAN PRN UNITS: 100 INJECTION, SOLUTION PARENTERAL at 10:31

## 2023-02-10 RX ADMIN — Medication SCH EACH: at 17:29

## 2023-02-10 RX ADMIN — INSULIN HUMAN PRN UNITS: 100 INJECTION, SOLUTION PARENTERAL at 22:41

## 2023-02-10 RX ADMIN — SODIUM CHLORIDE SCH MLS/HR: 9 INJECTION, SOLUTION INTRAVENOUS at 14:26

## 2023-02-10 RX ADMIN — CYCLOBENZAPRINE HYDROCHLORIDE SCH MG: 10 TABLET, FILM COATED ORAL at 08:33

## 2023-02-10 RX ADMIN — Medication SCH EACH: at 21:00

## 2023-02-10 RX ADMIN — CYCLOBENZAPRINE HYDROCHLORIDE SCH MG: 10 TABLET, FILM COATED ORAL at 23:01

## 2023-02-10 RX ADMIN — HEPARIN SODIUM SCH UNITS: 5000 INJECTION INTRAVENOUS; SUBCUTANEOUS at 08:33

## 2023-02-10 RX ADMIN — Medication PRN MG: at 02:27

## 2023-02-10 RX ADMIN — PREGABALIN SCH MG: 100 CAPSULE ORAL at 12:29

## 2023-02-10 RX ADMIN — DEXTROSE MONOHYDRATE SCH MLS/HR: 50 INJECTION, SOLUTION INTRAVENOUS at 21:00

## 2023-02-10 RX ADMIN — OXYCODONE HYDROCHLORIDE AND ACETAMINOPHEN PRN UDTAB: 5; 325 TABLET ORAL at 18:28

## 2023-02-10 RX ADMIN — INSULIN HUMAN PRN UNITS: 100 INJECTION, SOLUTION PARENTERAL at 13:19

## 2023-02-10 RX ADMIN — FUROSEMIDE SCH MG: 10 INJECTION, SOLUTION INTRAMUSCULAR; INTRAVENOUS at 17:30

## 2023-02-10 RX ADMIN — CYCLOBENZAPRINE HYDROCHLORIDE SCH MG: 10 TABLET, FILM COATED ORAL at 12:28

## 2023-02-10 RX ADMIN — Medication SCH EACH: at 12:28

## 2023-02-10 RX ADMIN — FUROSEMIDE SCH MG: 10 INJECTION, SOLUTION INTRAMUSCULAR; INTRAVENOUS at 08:32

## 2023-02-10 RX ADMIN — ATORVASTATIN CALCIUM SCH MG: 40 TABLET, FILM COATED ORAL at 23:01

## 2023-02-10 RX ADMIN — PREGABALIN SCH MG: 100 CAPSULE ORAL at 17:30

## 2023-02-10 RX ADMIN — Medication SCH EACH: at 00:06

## 2023-02-10 RX ADMIN — OXYCODONE HYDROCHLORIDE AND ACETAMINOPHEN PRN UDTAB: 5; 325 TABLET ORAL at 08:32

## 2023-02-10 RX ADMIN — CYCLOBENZAPRINE HYDROCHLORIDE SCH MG: 10 TABLET, FILM COATED ORAL at 17:30

## 2023-02-10 RX ADMIN — PREGABALIN SCH MG: 100 CAPSULE ORAL at 08:32

## 2023-02-10 RX ADMIN — ASPIRIN 81 MG SCH MG: 81 TABLET ORAL at 08:33

## 2023-02-10 RX ADMIN — Medication SCH EACH: at 04:23

## 2023-02-10 RX ADMIN — Medication SCH EACH: at 10:30

## 2023-02-10 RX ADMIN — EPOETIN ALFA SCH UNIT: 4000 SOLUTION INTRAVENOUS; SUBCUTANEOUS at 15:40

## 2023-02-10 RX ADMIN — METOPROLOL SUCCINATE SCH MG: 50 TABLET, EXTENDED RELEASE ORAL at 08:32

## 2023-02-10 NOTE — NUR
RN Closing Note

Patient AOx2-3, able to express concerns. Patient states he does not remember how he got to 
hospital or how long he has been here. Patient oriented to situation. Discussed plan of care 
and the need to stay till tomorrow. Patient agrees with plan. All safety precautions taken, 
dialysis ongoing now. 

All safety precautions taken, call light and table within reach, bed at lowest position.

## 2023-02-10 NOTE — NUR
RN Opening Note

Patient AOx1, able to express his concerns. Patient out of bed, reminded of precautions 
needed. Patient states he does not remember his situation or why he is here. Oriented pt to 
situation and place. Patient confused. 

All safety precautions taken, call light and table within reach, bed at lowest position.

## 2023-02-11 VITALS — SYSTOLIC BLOOD PRESSURE: 116 MMHG | DIASTOLIC BLOOD PRESSURE: 77 MMHG

## 2023-02-11 VITALS — SYSTOLIC BLOOD PRESSURE: 146 MMHG | DIASTOLIC BLOOD PRESSURE: 78 MMHG

## 2023-02-11 VITALS — SYSTOLIC BLOOD PRESSURE: 122 MMHG | DIASTOLIC BLOOD PRESSURE: 75 MMHG

## 2023-02-11 VITALS — SYSTOLIC BLOOD PRESSURE: 133 MMHG | DIASTOLIC BLOOD PRESSURE: 72 MMHG

## 2023-02-11 VITALS — SYSTOLIC BLOOD PRESSURE: 101 MMHG | DIASTOLIC BLOOD PRESSURE: 65 MMHG

## 2023-02-11 RX ADMIN — FUROSEMIDE SCH MG: 10 INJECTION, SOLUTION INTRAMUSCULAR; INTRAVENOUS at 08:33

## 2023-02-11 RX ADMIN — DEXTROSE MONOHYDRATE SCH MLS/HR: 50 INJECTION, SOLUTION INTRAVENOUS at 21:15

## 2023-02-11 RX ADMIN — METOPROLOL SUCCINATE SCH MG: 50 TABLET, EXTENDED RELEASE ORAL at 08:34

## 2023-02-11 RX ADMIN — Medication SCH EACH: at 12:17

## 2023-02-11 RX ADMIN — FUROSEMIDE SCH MG: 10 INJECTION, SOLUTION INTRAMUSCULAR; INTRAVENOUS at 17:39

## 2023-02-11 RX ADMIN — PREGABALIN SCH MG: 100 CAPSULE ORAL at 08:33

## 2023-02-11 RX ADMIN — Medication SCH EACH: at 17:17

## 2023-02-11 RX ADMIN — OXYCODONE HYDROCHLORIDE AND ACETAMINOPHEN PRN UDTAB: 5; 325 TABLET ORAL at 17:39

## 2023-02-11 RX ADMIN — INSULIN HUMAN PRN UNITS: 100 INJECTION, SOLUTION PARENTERAL at 21:20

## 2023-02-11 RX ADMIN — INSULIN HUMAN PRN UNITS: 100 INJECTION, SOLUTION PARENTERAL at 01:54

## 2023-02-11 RX ADMIN — Medication SCH EACH: at 08:31

## 2023-02-11 RX ADMIN — CYCLOBENZAPRINE HYDROCHLORIDE SCH MG: 10 TABLET, FILM COATED ORAL at 21:13

## 2023-02-11 RX ADMIN — ASPIRIN 81 MG SCH MG: 81 TABLET ORAL at 08:33

## 2023-02-11 RX ADMIN — INSULIN HUMAN PRN UNITS: 100 INJECTION, SOLUTION PARENTERAL at 12:20

## 2023-02-11 RX ADMIN — Medication SCH EACH: at 01:42

## 2023-02-11 RX ADMIN — CYCLOBENZAPRINE HYDROCHLORIDE SCH MG: 10 TABLET, FILM COATED ORAL at 17:40

## 2023-02-11 RX ADMIN — Medication SCH EACH: at 21:15

## 2023-02-11 RX ADMIN — Medication SCH EACH: at 05:00

## 2023-02-11 RX ADMIN — CYCLOBENZAPRINE HYDROCHLORIDE SCH MG: 10 TABLET, FILM COATED ORAL at 08:35

## 2023-02-11 RX ADMIN — ATORVASTATIN CALCIUM SCH MG: 40 TABLET, FILM COATED ORAL at 21:22

## 2023-02-11 RX ADMIN — PREGABALIN SCH MG: 100 CAPSULE ORAL at 17:39

## 2023-02-11 RX ADMIN — SODIUM CHLORIDE SCH MLS/HR: 9 INJECTION, SOLUTION INTRAVENOUS at 16:53

## 2023-02-11 RX ADMIN — OXYCODONE HYDROCHLORIDE AND ACETAMINOPHEN PRN UDTAB: 5; 325 TABLET ORAL at 03:57

## 2023-02-11 RX ADMIN — ACETAMINOPHEN PRN MG: 325 TABLET ORAL at 06:24

## 2023-02-11 RX ADMIN — INSULIN HUMAN PRN UNITS: 100 INJECTION, SOLUTION PARENTERAL at 17:25

## 2023-02-11 RX ADMIN — PREGABALIN SCH MG: 100 CAPSULE ORAL at 13:13

## 2023-02-11 RX ADMIN — CYCLOBENZAPRINE HYDROCHLORIDE SCH MG: 10 TABLET, FILM COATED ORAL at 13:13

## 2023-02-11 NOTE — NUR
TELE RN OPENING NOTE



RECEIVED PATIENT AWAKE, SITTING, A/O X 4. BREATHING EVEN AND NON-LABORED ON ROOM AIR. NOT IN 
APPARENT DISTRESS. HAS RIGHT UPPER ARM MIDLINE #18G SALINE LOCKED, NO S/S OF INFILTRATION 
NOTED. SAFETY PRECAUTIONS IMPLEMENTED, CALL LIGHT WITHIN REACH. WILL CONTINUE PLAN OF CARE.

## 2023-02-11 NOTE — NUR
GOT A CALL FROM ULTRASOUND TECH, REGARDING POSSIBLE SCHEDULING ULTRASOUND GUIDED 
THORACENTESIS. HAVE PT SIGN CONSENT FOR PROCEDURE.

## 2023-02-11 NOTE — NUR
MEDICATION FERLICIT STARTED AT 16:50 NOT AT 1400 AS SCHEDULED, D/T MED BEEN DELIVERED FROM 
PHARMACY AT 1420, AND PT ALREADY UNDERGOING DIALYSIS.

## 2023-02-11 NOTE — NUR
PATIENT HAD DIALYSIS PERFORMED FROM 14:00 TO 16:20, TOLERATED  WELL. VITALS STABLE. 3008CC 
OF FLUID TAKEN.

## 2023-02-11 NOTE — NUR
TELE RN CLOSING NOTE



PATIENT AWAKE, AMBULATORY WITH ASSISTANCE, A/O X 4. SATURATION 96 ON 2L VIA NC. NOT IN 
APPARENT DISTRESS. TOLERATED DIALYSIS WELL. RIGHT UPPER ARM MIDLINE #18G SALINE LOCKED, NO 
S/S OF INFILTRATION NOTED. SAFETY PRECAUTIONS IMPLEMENTED, CALL LIGHT WITHIN REACH. WILL 
ENDORSE TO THE NIGHT NURSE FOR PRATEEK.

## 2023-02-12 VITALS — SYSTOLIC BLOOD PRESSURE: 131 MMHG | DIASTOLIC BLOOD PRESSURE: 75 MMHG

## 2023-02-12 VITALS — SYSTOLIC BLOOD PRESSURE: 118 MMHG | DIASTOLIC BLOOD PRESSURE: 78 MMHG

## 2023-02-12 VITALS — SYSTOLIC BLOOD PRESSURE: 138 MMHG | DIASTOLIC BLOOD PRESSURE: 70 MMHG

## 2023-02-12 VITALS — SYSTOLIC BLOOD PRESSURE: 133 MMHG | DIASTOLIC BLOOD PRESSURE: 72 MMHG

## 2023-02-12 VITALS — SYSTOLIC BLOOD PRESSURE: 141 MMHG | DIASTOLIC BLOOD PRESSURE: 86 MMHG

## 2023-02-12 LAB
ALBUMIN SERPL BCP-MCNC: 1.5 G/DL (ref 3.4–5)
ALP SERPL-CCNC: 146 U/L (ref 46–116)
ALT SERPL W P-5'-P-CCNC: 14 U/L (ref 12–78)
AST SERPL W P-5'-P-CCNC: 25 U/L (ref 15–37)
BILIRUB SERPL-MCNC: 0.2 MG/DL (ref 0.2–1)
BUN SERPL-MCNC: 28 MG/DL (ref 7–18)
CALCIUM SERPL-MCNC: 8 MG/DL (ref 8.5–10.1)
CHLORIDE SERPL-SCNC: 101 MMOL/L (ref 98–107)
CO2 SERPL-SCNC: 27 MMOL/L (ref 21–32)
CREAT SERPL-MCNC: 3.8 MG/DL (ref 0.6–1.3)
GLUCOSE SERPL-MCNC: 177 MG/DL (ref 74–106)
POTASSIUM SERPL-SCNC: 3.6 MMOL/L (ref 3.5–5.1)
PROT SERPL-MCNC: 6.1 G/DL (ref 6.4–8.2)
SODIUM SERPL-SCNC: 135 MMOL/L (ref 136–145)

## 2023-02-12 RX ADMIN — ATORVASTATIN CALCIUM SCH MG: 40 TABLET, FILM COATED ORAL at 21:34

## 2023-02-12 RX ADMIN — Medication SCH EACH: at 07:54

## 2023-02-12 RX ADMIN — Medication SCH EACH: at 12:18

## 2023-02-12 RX ADMIN — DEXTROSE MONOHYDRATE SCH MLS/HR: 50 INJECTION, SOLUTION INTRAVENOUS at 21:33

## 2023-02-12 RX ADMIN — FUROSEMIDE SCH MG: 10 INJECTION, SOLUTION INTRAMUSCULAR; INTRAVENOUS at 08:36

## 2023-02-12 RX ADMIN — Medication SCH EACH: at 01:00

## 2023-02-12 RX ADMIN — CYCLOBENZAPRINE HYDROCHLORIDE SCH MG: 10 TABLET, FILM COATED ORAL at 08:35

## 2023-02-12 RX ADMIN — CYCLOBENZAPRINE HYDROCHLORIDE SCH MG: 10 TABLET, FILM COATED ORAL at 12:12

## 2023-02-12 RX ADMIN — Medication SCH EACH: at 16:46

## 2023-02-12 RX ADMIN — INSULIN HUMAN PRN UNITS: 100 INJECTION, SOLUTION PARENTERAL at 13:01

## 2023-02-12 RX ADMIN — METOPROLOL SUCCINATE SCH MG: 50 TABLET, EXTENDED RELEASE ORAL at 08:36

## 2023-02-12 RX ADMIN — PREGABALIN SCH MG: 100 CAPSULE ORAL at 08:35

## 2023-02-12 RX ADMIN — CYCLOBENZAPRINE HYDROCHLORIDE SCH MG: 10 TABLET, FILM COATED ORAL at 16:27

## 2023-02-12 RX ADMIN — Medication SCH EACH: at 23:43

## 2023-02-12 RX ADMIN — PREGABALIN SCH MG: 100 CAPSULE ORAL at 12:12

## 2023-02-12 RX ADMIN — FUROSEMIDE SCH MG: 10 INJECTION, SOLUTION INTRAMUSCULAR; INTRAVENOUS at 16:27

## 2023-02-12 RX ADMIN — Medication SCH EACH: at 05:53

## 2023-02-12 RX ADMIN — INSULIN HUMAN PRN UNITS: 100 INJECTION, SOLUTION PARENTERAL at 23:42

## 2023-02-12 RX ADMIN — Medication SCH ML: at 08:36

## 2023-02-12 RX ADMIN — CYCLOBENZAPRINE HYDROCHLORIDE SCH MG: 10 TABLET, FILM COATED ORAL at 21:34

## 2023-02-12 RX ADMIN — SODIUM CHLORIDE SCH MLS/HR: 9 INJECTION, SOLUTION INTRAVENOUS at 14:18

## 2023-02-12 RX ADMIN — PREGABALIN SCH MG: 100 CAPSULE ORAL at 16:27

## 2023-02-12 RX ADMIN — ASPIRIN 81 MG SCH MG: 81 TABLET ORAL at 08:35

## 2023-02-12 RX ADMIN — Medication PRN MG: at 05:46

## 2023-02-12 NOTE — NUR
CLOSING NOTES;



Patient in bed Aaox3 with confusion but able to make needs known,on 3l o2 via nc tenisha,well 
satting 96.6%,no sign Sob/Distress noted,no complained of pain/discomfort during shift,all 
needs attended,IV access on CHAKA midline patent and intact.Pt BRP Ambulatory with 
assist,safety measure in place,Call light within reach. HD today, 3 liters off. Will 
Endorsed to next shift.

## 2023-02-12 NOTE — NUR
RN NOTE



RECEIVED PT IN BED COMFORTABLY RESTING. PT AAOX3, VERBALLY RESPONSIVE. ON O2 AT 2LPM VIA NC, 
RESPIRATIONS LABORED, ACUTE RESP DISTRESS NOTED. PT DENIES PAIN/DISCOMFORT AT THIS TIME. IV 
ACCESS CHAKA ML SL, CLEAN, DRY, INTACT. CALL LIGHT WITHIN REACH, SAFETY PRECAUTION IMPLEMENTED 
AT ALL TIMES. WILL CONT POC.

## 2023-02-12 NOTE — NUR
PATIENT IS RESTING IN BED COMFORTABLY, NO COMPLAINT OF PAIN, NO SIGN OF IN DISTRESS, 
UNLABORED BREATHING ON ROOM AIR, SPO2-98%. IV LINE IS PATENT AND INTACT.

## 2023-02-12 NOTE — NUR
REPORT GIVE TO  RN, PATIENT C/O ANXIETY, NOT BEING ABLE TO SLEEP, AND A HEADACHE BP WAS 
202/84, DR. WARE MADE AWARE, NEW ORDERS GIVEN, HYDRALAZINE 10CC GIVEN AND ATIVEN 0.5 PO 
GIVEN, PATIENT SLEEPING NOW AND BP AT 0700 /93.

## 2023-02-13 VITALS — SYSTOLIC BLOOD PRESSURE: 130 MMHG | DIASTOLIC BLOOD PRESSURE: 87 MMHG

## 2023-02-13 VITALS — SYSTOLIC BLOOD PRESSURE: 108 MMHG | DIASTOLIC BLOOD PRESSURE: 58 MMHG

## 2023-02-13 VITALS — SYSTOLIC BLOOD PRESSURE: 129 MMHG | DIASTOLIC BLOOD PRESSURE: 82 MMHG

## 2023-02-13 LAB
BASOPHILS # BLD AUTO: 0 K/UL (ref 0–0.2)
BASOPHILS NFR BLD AUTO: 0.5 % (ref 0–2)
BUN SERPL-MCNC: 30 MG/DL (ref 7–18)
CALCIUM SERPL-MCNC: 8.3 MG/DL (ref 8.5–10.1)
CHLORIDE SERPL-SCNC: 102 MMOL/L (ref 98–107)
CO2 SERPL-SCNC: 28 MMOL/L (ref 21–32)
CREAT SERPL-MCNC: 4.3 MG/DL (ref 0.6–1.3)
EOSINOPHIL NFR BLD AUTO: 9.8 % (ref 0–6)
GLUCOSE SERPL-MCNC: 107 MG/DL (ref 74–106)
HCT VFR BLD AUTO: 27 % (ref 39–51)
HGB BLD-MCNC: 8.6 G/DL (ref 13.5–17.5)
LYMPHOCYTES NFR BLD AUTO: 1.2 K/UL (ref 0.8–4.8)
LYMPHOCYTES NFR BLD AUTO: 17.7 % (ref 20–44)
MAGNESIUM SERPL-MCNC: 1.9 MG/DL (ref 1.8–2.4)
MCHC RBC AUTO-ENTMCNC: 32 G/DL (ref 31–36)
MCV RBC AUTO: 82 FL (ref 80–96)
MONOCYTES NFR BLD AUTO: 0.7 K/UL (ref 0.1–1.3)
MONOCYTES NFR BLD AUTO: 10.2 % (ref 2–12)
NEUTROPHILS # BLD AUTO: 4.1 K/UL (ref 1.8–8.9)
NEUTROPHILS NFR BLD AUTO: 61.8 % (ref 43–81)
PHOSPHATE SERPL-MCNC: 4.7 MG/DL (ref 2.5–4.9)
PLATELET # BLD AUTO: 353 K/UL (ref 150–450)
POTASSIUM SERPL-SCNC: 3.7 MMOL/L (ref 3.5–5.1)
RBC # BLD AUTO: 3.31 MIL/UL (ref 4.5–6)
SODIUM SERPL-SCNC: 138 MMOL/L (ref 136–145)
WBC NRBC COR # BLD AUTO: 6.7 K/UL (ref 4.3–11)

## 2023-02-13 PROCEDURE — 0W9B3ZX DRAINAGE OF LEFT PLEURAL CAVITY, PERCUTANEOUS APPROACH, DIAGNOSTIC: ICD-10-PCS

## 2023-02-13 RX ADMIN — INSULIN HUMAN PRN UNITS: 100 INJECTION, SOLUTION PARENTERAL at 09:43

## 2023-02-13 RX ADMIN — INSULIN HUMAN PRN UNITS: 100 INJECTION, SOLUTION PARENTERAL at 01:40

## 2023-02-13 RX ADMIN — CYCLOBENZAPRINE HYDROCHLORIDE SCH MG: 10 TABLET, FILM COATED ORAL at 13:48

## 2023-02-13 RX ADMIN — Medication SCH EACH: at 06:51

## 2023-02-13 RX ADMIN — FUROSEMIDE SCH MG: 10 INJECTION, SOLUTION INTRAMUSCULAR; INTRAVENOUS at 09:29

## 2023-02-13 RX ADMIN — Medication SCH ML: at 09:29

## 2023-02-13 RX ADMIN — PREGABALIN SCH MG: 100 CAPSULE ORAL at 09:29

## 2023-02-13 RX ADMIN — INSULIN HUMAN PRN UNITS: 100 INJECTION, SOLUTION PARENTERAL at 13:57

## 2023-02-13 RX ADMIN — METOPROLOL SUCCINATE SCH MG: 50 TABLET, EXTENDED RELEASE ORAL at 09:24

## 2023-02-13 RX ADMIN — Medication SCH EACH: at 09:30

## 2023-02-13 RX ADMIN — OXYCODONE HYDROCHLORIDE AND ACETAMINOPHEN PRN UDTAB: 5; 325 TABLET ORAL at 09:24

## 2023-02-13 RX ADMIN — EPOETIN ALFA SCH UNIT: 4000 SOLUTION INTRAVENOUS; SUBCUTANEOUS at 15:45

## 2023-02-13 RX ADMIN — Medication PRN MG: at 01:42

## 2023-02-13 RX ADMIN — ASPIRIN 81 MG SCH MG: 81 TABLET ORAL at 09:24

## 2023-02-13 RX ADMIN — CYCLOBENZAPRINE HYDROCHLORIDE SCH MG: 10 TABLET, FILM COATED ORAL at 09:23

## 2023-02-13 RX ADMIN — INSULIN HUMAN PRN UNITS: 100 INJECTION, SOLUTION PARENTERAL at 06:54

## 2023-02-13 RX ADMIN — PREGABALIN SCH MG: 100 CAPSULE ORAL at 13:48

## 2023-02-13 RX ADMIN — Medication SCH EACH: at 13:42

## 2023-02-13 RX ADMIN — Medication SCH EACH: at 01:39

## 2023-02-13 NOTE — NUR
RN NOTE



PT REMAINS IN STABLE CONDITION. VSS. BS WNL, NO S/SX OFHYPO/HYPERGLYCEMIA NOTED. ALL DUE 
MEDICATIONS GIVEN AS ORDERED. ANEEDS NEEDS ATTENDED. KEPT CLEAN, DRY AND COMFORTABLE AT ALL 
TIMES. WILL ENDORSE TO AM SHIFT

## 2023-02-13 NOTE — NUR
RN NOTE



PT VERBALIZED 8/10 PAIN ON LOWER BACK, PRN MEDICATION GIVEN AS ORDERED PER PT'S REQUEST. 
VSS. WILL REASSESS.

## 2023-02-13 NOTE — NUR
RN NOTE



PATIENT DISCHARGED HOME, VIA CAB. PATIENT IN STABLE CONDITION. REMOVED MIDLINE IV, AND ID 
BAND. PATIENT SIGNED DISCHARGED PACKET AND BELONGINGS LIST. PATIENT VERBALIZED UNDERSTANDING 
OF DISCHARGE INSTRUCTIONS. CHARGE NURSE AWARE.

## 2025-01-28 NOTE — NUR
OPENING NOTES;



Received pt in bed sleeping but easy to aroused,on 3l o2 via nc tenisha,well satting 98%,no sign 
sob/distress noted, IV access on CHAKA midline patent and intact.safety measure in place,Call 
light within reach. Will continue to monitor. show

## 2025-02-04 NOTE — NUR
CALLED [facility] ER REPORT GIVEN ALL PERTINENT PT INFO PROVIDED. PT WILL 
BE GOING TO . Left message for patient at    Telephone Information:   Mobile [phone]    to schedule procedure.  Patient to return call to Griselda BOWEN [phone].  Contact letter mailed